# Patient Record
Sex: FEMALE | Race: WHITE
[De-identification: names, ages, dates, MRNs, and addresses within clinical notes are randomized per-mention and may not be internally consistent; named-entity substitution may affect disease eponyms.]

---

## 2019-06-27 NOTE — NUR
TELE RN CLOSING NOTES



PT REMAINS IN BED, AWAKE. A/OX2-3, Malay SPEAKING, CAN UNDERSTAND A LITTLE ENGLISH, WITH 
EPISODES OF CONFUSION. PT ON SUPPLEMENTAL OXYGEN AT 2L VIA NC, WITH NO RESPIRATORY DISTRESS 
NOTED. ON TELEMONITORING WITH SR HR OF 85. PT DENIES ANY PAIN OR DISCOMFORT. IVF NS AT 
75ML/HR TO RIGHT HAND G20, INTACT AND FLUID INFUSING WELL WITH NO INFILTRATION NOTED. PT HAS 
DOAN CATH WITH JAMES-YELLOWISH URINE IN THE BAG PRESENT, URINE OUTPUT OF 350ML.  ALL NEEDS 
AND CARE PROVIDED. REPOSITIONED [id number]. PT'S BED KEPT IN LOWEST, LOCKED POSITION WITH SR 
X3. CALL LIGHT AND FLUID KEPT WITHIN REACH. WILL ENDORSE TO NIGHT SHIFT NURSE FOR USMAN.

## 2019-06-27 NOTE — NUR
RN PM OPENING NOTES. 



BEDSIDE REPORT RECIEVED FROM CECI RODRIGUEZ. PT REMAINS IN BED, AWAKE. A/OX2-3, PATIENT SPEAKS 
Spanish PER GRAND DAUGHTER WHOM IS AT THE BEDSIDE. CAN UNDERSTAND A LITTLE ENGLISH BUT WILL 
RESPOND IN Spanish PER GRAND DAUGHTER. . PT ON SUPPLEMENTAL OXYGEN AT 2L VIA NC, WITH NO 
RESPIRATORY DISTRESS NOTED. TELEMETRY SR IN 80'S. PT DENIES ANY PAIN OR DISCOMFORT. IVF NS 
AT 75ML/HR TO RIGHT HAND G20, INTACT AND FLUID INFUSING NO INFILTRATION NOTED.  DOAN CATH 
WITH JAMES-YELLOWISH URINE IN THE BAG PRESENT.  SKIN PRECAUTIONS BED  IN LOW, LOCKED 
POSITION WITH SR X3. CALL LIGHT KEPT WITHIN REACH. WILL CONT TO MONITOR.

## 2019-06-27 NOTE — NUR
TELE RN ADMISSION NOTES





RECEIVED PT FROM ER DEPT. REPORT GIVEN BY RN/MACARIO. PT BROUGHT IN TO THE UNIT VIA GURNEY TO 
ROOM 307-2. PT INTERMITTENTLY DOZING OFF, EASILY AROUSED. A/OX2-3, Tongan SPEAKING, CAN 
UNDERSTAND A LITTLE ENGLISH. PT ON SUPPLEMENTAL OXYGEN AT 2L VIA NC, WITH NO RESPIRATORY 
DISTRESS NOTED, SATURATING 100%. PT HOOKED ON TELEMONITORING WITH SR HR OF 83. PT DENIES ANY 
PAIN OR DISCOMFORT. PIV RIGHT HAND G20, FLUSHED WITH NS, INTACT AND OPERATIONAL; HOOKED IVF 
NS AT 75ML/HR, INTACT AND FLUID INFUSING WELL WITH NO INFILTRATION NOTED. RIGHT HAND IS 
RIGID AND CONTRACTED. SKIN ASSESSED, INTACT. SACRUM WITH NON BLANCHABLE REDNESS, MEPILEX 
APPLIED AND PICTURE TAKEN AND FILED IN THE CHART. NO OTHER SKIN ISSUES NOTED AT THE TIME OF 
ADMISSION. ADMISSION INFORMATION PROVIDED FROM SNF RECORDS. PT UNABLE TO ANSER MOST OF 
QUESTIONS. NO FAMILY MEMBER PRESENT DURING ADMISSION. PT HAS DOAN CATH WITH JAMES-YELLOWISH 
URINE IN THE BAG PRESENT. PER ER NURSE/MACARIO, PT HAD TOTAL OF 2 BAGS OF 10MEG POTASSIUM IV 
AND 2L NS BOLUS AT ER. RN/MACARIO ENDORSED 2BAGS OF POTASSIUM TO BE INFUSED IN THE UNIT. NO 
EDEMA PRESENT AT THIS MOMENT NOTED. ALL NEEDS AND CARE PROVIDED. VITAL SIGNS, RECORDED. 
HOSPITALIST DR ANUJ MOORE. PRESENT IN THE UNIT, AWARE OF PT'S ADMISSION. WILL CONTINUE PLAN OF 
CARE.

## 2019-06-27 NOTE — NUR
RN NOTES



PT REFUSED TO HAVE INSULIN 4UNITS SHOT PER SLIDING SCALE. PT A LITTLE COMBATIVE. . 
WILL CONTINUE TO MONITOR.

## 2019-06-27 NOTE — NUR
[id number] FOR WEAKNESS AND MORE ALTERED THAN USUAL X YESTERDAY, PT IS 
AAOX0, NOT IN RESPIRATORY DISTRESS, HOOKED TO MONITOR, KEPT RESTED AND 
COMFORTABLE, WILL CONTINUE TO MONITOR.

## 2019-06-28 NOTE — NUR
WOUND CARE CONSULT   WOUND CARE RECEIVED CONSULT FOR REDNESS TO SACRUM, WOUND TO RIGHT 
PERINEUM AND LOWER BUTTOCKS.  WOUND CARE WILL DEFER CONSULT AND TREATMENT PLANS TO PLASTIC 
SURGICAL TEAM WHO ARE CURRENTLY FOLLOWING THIS PATIENT.  PATIENT WITH ALLI AT 12, ALL 
PRESSURE ULCER PREVENTION MEASURES ARE NOTED TO BE IN PLACE AT THIS TIME.  WILL SEE PRN.

## 2019-06-28 NOTE — NUR
TELE RN CLOSING NOTES



PT REMAINS IN BED, SLEEPING INTERMITANTLY. PATIENT HAD A FEW EPISODES OF CONFUSION DURING 
SHIFT BUT REORIENTED TO SURROUNDINGS. PT ON SUPPLEMENTAL OXYGEN AT 2L VIA NC, WITH NO 
RESPIRATORY DISTRESS NOTED. ON TELEMONITORING WITH SR. FLACC SCALE OF 2 SO NO APPARENT 
DISCOMFORT.  IVF INFUSING AT NS AT 75ML/HR TO RIGHT HAND G20,  NO INFILTRATION NOTED. PT HAS 
DOAN CATH WITH JAMES-YELLOWISH URINE IN THE BAG PRESENT.  REPOSITIONED [id number]. SAFETY 
MEASURES IN PLACE, PT'S BED KEPT IN LOWEST, LOCKED POSITION WITH SR X3.

## 2019-06-28 NOTE — NUR
TELE RN OPENING NOTES



RECEIVED PT LAYING IN BED, RESTING COMFORTABLY. PT IS EASILY AROUSABLE. RESPIRATIONS ARE 
EVEN AND UNLABORED, NOT IN ANY ACUTE DISTRESS NOTED. NO FACIAL GRIMACING OR MOANING NOTED. 
IV ACCESS TO LEFT HAND INTACT, NO INFILTRATION NOTED. DRESSING KEPT CLEAN AND DRY. SAFETY 
MEASURES ARE IN PLACE. INSTRUCTED PT TO USE CALL LIGHT WHEN ASSISTANCE IS NEEDED, CALL LIGHT 
IS LEFT WITHIN REACH. WILL MONITOR THROUGHOUT SHIFT FOR CONTINUITY OF CARE.

## 2019-06-28 NOTE — NUR
MS RN CLOSING NOTES



ALL DUE MEDS GIVEN. NEEDS MET AND ANTICIPATED. PT IS A/O X2, AFEBRILE. RESPIRATIONS ARE EVEN 
AND UNLABORED, NOT IN ANY ACUTE DISTRESS NOTED. NO FACIAL GRIMACING OR MOANING NOTED. IV 
SITE TO RIGHT HAND INTACT, NO INFILTRATION NOTED. DRESSING KEPT CLEAN AND DRY. SAFETY 
MEASURES ARE IN PLACE. REPOSITIONED Q2H. WILL ENDORSE TO NEXT SHIFT FOR CONTINUITY OF CARE.

## 2019-06-28 NOTE — NUR
MS RN OPENING NOTES



Received patient resting in bed, alert, oriented x 2. Peripheral IV infusing at 75mL/hr. 
Mcclain catheter in place, draining yellow urine. Breathing even and unlabored. Not in any 
distress, with supplemental O2 at 2LPM via nasal cannula. Safety measures in place; call 
light within reach, bed in low, locked position. Will continue to monitor accordingly

## 2019-06-28 NOTE — NUR
MS RN NOTES-- NEEDS MET AND ANTICIPATED. PT DOES NOT APPEAR TO BE IN ANY APPARENT DISTRESS. 
REPOSITIONED Q2H. WILL CONTINUE TO MONITOR.

## 2019-06-29 NOTE — NUR
MS RN NOTES-- NEEDS ANTICIPATED. PT DOES NOT APPEAR TO BE IN ANY DISTRESS. REPOSITIONED Q2H. 
WILL CONTINUE TO MONITOR.

## 2019-06-29 NOTE — NUR
MS RN CLOSING NOTES



ALL DUE MEDS GIVEN, NEEDS MET AND RENDERED. PT IS A/O X1-2 AFEBRILE. RESPIRATIONS ARE EVEN 
AND UNLABORED, NOT IN ANY ACUTE DISTRESS NOTED. PT DENIES ANY PAIN AT THIS TIME, NO C/O SOB, 
N/V. IV ACCESS TO RIGHT HAND INTACT, NO INFILTRATION NOTED. DRESSING KEPT CLEAN AND DRY. 
SAFETY MEASURES ARE IN PLACE. REMINDED PT TO USE CALL LIGHT WHEN ASSISTANCE IS NEEDED. WILL 
ENDORSE TO NEXT SHIFT FOR CONTINUITY OF CARE.

## 2019-06-29 NOTE — NUR
MS RN CLOSING NOTES



Patient sleeping in bed, easily arousable. Breathing even and unlabored. Not in any 
distress, on 2L via nasal cannula. Peripheral IV infusing at 75mL/hr. BSL recheked- 64mg/dL. 
No acute changes overnight. All needs attended and anticipated. Safety measures in place. 
Endorsed USMAN to AM shift RN

## 2019-06-29 NOTE — NUR
MS RN OPENING NOTES



RECEIVED PT LAYING IN BED, RESTING COMFORTABLY. PT IS EASILY AROUSABLE. RESPIRATIONS ARE 
EVEN AND UNLABORED, NOT IN ANY ACUTE DISTRESS NOTED. NO FACIAL GRIMACING OR MOANING NOTED. 
IV ACCESS TO RIGHT HAND INTACT, NO INFILTRATION NOTED. DRESSING KEPT CLEAN AND DRY. SAFETY 
MEASURES ARE IN PLACE. INSTRUCTED PT TO USE CALL LIGHT WHEN ASSISTANCE IS NEEDED, CALL LIGHT 
IS LEFT WITHIN REACH. WILL MONITOR THROUGHOUT SHIFT FOR CONTINUITY OF CARE.

## 2019-06-30 NOTE — NUR
MS RN CLOSING NOTES



Patient sleeping in bed, easily arousable. Breathing even and unlabored. Not in any 
distress, on 2L via nasal cannula. Peripheral IV infusing at 75mL/hr. Mcclain catheter in 
place, drained 1,800 mL of urine. No acute changes overnight. All needs attended and 
anticipated. Safety measures in place. Will endorse USMAN to AM shift RN

## 2019-06-30 NOTE — NUR
RN OPENING NOTES



RECEIVED PATIENT IN BED AWAKE. Malaysian SPEAKING ONLY. A/O X 1. RESPIRATIONS EVEN AND 
UNLABORED. NO SIGNS OF RESPIRATORY DISTRESS. DENIES SOB. DENIES PAIN OR DISCOMFORT AT THIS 
TIME. IV SITE RIGHT HAND INFUSING NS @75 ML/HR. SAFETY PRECAUTIONS IMPLEMENTED; CALL LIGHT 
WITHIN REACH, BED LOWEST POSITION, BED LOCKED, SIDE RAILS UP X2. WILL CONTINUE TO MONITOR.

## 2019-06-30 NOTE — NUR
RN NOTES

 RECEIVED PATIENT IN THE BED SLEEPING.  NO ACUTE RESPIRATORY DISTRESS, V/S STABLE, INFUSING 
NS AT RIGHT HAND 75 ML/HR INTACT, CALL LIGHT WITHIN TO REACH, CONTINUED MONITORING.

## 2019-06-30 NOTE — NUR
RN NOTES

 BS-107 MG/DL NO COVERAGE GIVEN, ADMINISTERED SCHEDULED MEDICATION. PATIENT POOR EATER EAT 
25% WITH ASSIST. PATIENT CONFUSED, DELUSIONAL ASSIST TURN AND REPOSTION Q 2 HR.

## 2019-06-30 NOTE — NUR
RN NOTES

 BS-100 MG/DL NO COVRAGE GIVEN ADMINISTERED SCHEDULED MEDICATION, PATENT EAT 20%, STILL 
CONFUSED, DELUSIONAL, INFUSING POTASSIUM 30ML/HR ON RIGHT HAND INTACT.

## 2019-06-30 NOTE — NUR
RN NOTES

 PATIENT'S SON NEXT TO THE BED, ASSIST EATING BREAKFAST.  HOB KEEP ELEVATED FOR ASPIRATION 
PRECAUTION. ADMINISTERED SCHEDULED MEDICATION BY CRUSHED AND MIXED WITH APPLE SOUSE, PATIENT 
Kenyan SPEAKER. PATIENT BED BOUND,  RIGHT SIGHT WEAKNESS, EDEMA ON RIGHT HAND KEEP ELEVATED 
USING PILLOW,RIGHT SIDE OF FACE ALSO DROOPING.  F/C DRAINING LIGHT YELLOW OUTPUT. ASSIST 
PATIENT TURN AND REPOSTION Q 2 HR. INFUSING NS AT 75 ML/HE N RIGHT HAND. CONTINUED 
MONITORING.

## 2019-06-30 NOTE — NUR
RN NOTES

 PATIENT CONFUSED, IRRITABLE, FINISHED POTASSIUM INFUSION. CONTINUED NA  75 ML/HR INFUSION 
INTACT. ASSIST TURN AND REPOSTION Q 2 HR. F/C DRAINING YELLOW OUTPUT TOTAL IS 1500 ML. CALL 
LIGHT WITHIN TO REACH. PATIENT REFUSED PAIN. ENDORSED ONCOMING NURSE FOLLOW PLAN OF CARE.

## 2019-07-01 NOTE — NUR
RN CLOSING NOTES



PATIENT IS SLEEPING IN BED, EASILY AROUSABLE. NO SIGNS OF RESPIRATORY DISTRESS. NO SIGNS OF 
SHORTNESS OF BREATH. PATIENT ON ROOM AIR, TOLERATING WELL. DOAN CATHETER IN PLACE. NO ACUTE 
CHANGES OVERNIGHT. WOUND CARE DONE ON BUTTOCKS, SACRAL AND PERINEUM. PATIENT TURNED EVERY 2 
HOURS. ALL NEEDS ATTENDED AND ANTICIPATED. SAFETY PRECAUTIONS IMPLEMENTED; CALL LIGHT WITHIN 
REACH, BED IN LOWEST POSITION, BED LOCKED, SIDE RAILS UP X2. ENDORSED CARE TO MICHAEL ROBISON 
FOR CONTINUITY OF CARE.

## 2019-07-01 NOTE — NUR
MS RN NOTES:



BLOOD SUGAR THIS PM . NO INSULIN WAS ADMINISTERED. INSTEAD, FED PT MORE APPLE SAUCE 
ALTHOUGH PT IS REFUSING MORE THAN 2 SPOONFULS. WILL CONTINUE TO OFFER AND MONITOR.

## 2019-07-01 NOTE — NUR
RN NOTES 

PATIENT SON NEXT TO THE BED ASSISTING PATIENT TO EAT, V/S STABLE, ADMINISTERED SCHEDULED 
MEDICATION. PATIENT REFUSED PAIN. ASSIST TURN AND REPOSTION Q2 HR.  INFUSING NS AT 75 ML/HR 
INTACT. CALL LIGHT WITHIN TO REACH. CONTINUED MONITORING.

## 2019-07-01 NOTE — NUR
RN NOTES

 PATIENT STABLE , ON O2-2L NC, ADMINISTERED SCHEDULED MEDICATION,  ASSIST TURN AND REPOSTION 
Q 2 HR,   F/C DRAINING LIGHT YELLOW OUTPUT, INFUSING NS AT 75 ML/HR ON RIGHT HAND INTACT, 
MEDICATION WERE ADMINISTERED FOR PAIN EFFECTIVE. ENDORSED ONCOMING NURSE FOLLOW PLAN OF 
CARE.

## 2019-07-01 NOTE — NUR
RN NOTES

 ADMINISTERED NARCO 5/325 MG PO PRN FOR GENERALIZED PAIN 6/10 PER PATIENT REQUEST. V/S TAKEN 
/74, P-92. CONTINUED MONITORING.

## 2019-07-01 NOTE — NUR
RN NOTES

 BS-126 MG/DL, PATIENT EAT 15%, ADMINISTERED SCHEDULED MEDICATION, ASSIST TURN AND REPOSTION 
Q 2 HR. CONTINUED MONITORING.

## 2019-07-01 NOTE — NUR
RN NOTES

 PATIENT SLEEPING AT THIS TIME, NO ACUTE RESPIRATORY DISTRESS,ON O2-2L NC. AROUSE WHEN 
CALLED NAME OR TOUCHED.  F/C DRAINING YELLOW OUTPUT. CALL LIGHT WITHIN TO REACH. CONTINUED 
MONITORING.

## 2019-07-01 NOTE — NUR
RN NOTES

 BS-117 MG/DL, NO COVERAGE GIVEN, PATIENT EAT 75 %, SISTER NEXT TO THE BED. ASSIST TURN AND 
REPOSTION Q 2 HR. SAFETY PRECAUTION MAINTAINED ALL THE TIME.

## 2019-07-01 NOTE — NUR
MS RODRIGUEZ OPENING NOTES:



RECEIVED PT ON ROOM AIR AND IS TOLERATING WELL. PT APPEARS TO BE CONFUSED AND Uzbek 
SPEAKING ONLY. PT HAS IV ON L AC #20G AND IS BEING INFUSED WITH IV  NS AT 75ML/HR. BED ALARM 
ACTIVATED. PT HAS DOAN CATH AND IS ATTACHED TO DRAINAGE BAG WITH YELLOW URINE DRAINING. BED 
KEPT IN LOW, LOCKED POSITION, AND SIDE RAILS X 2UP. WILL CONTINUE TO MONITOR PT. 

-------------------------------------------------------------------------------

Addendum: 07/01/19 at 1959 by HARISH GAMEZ RN

-------------------------------------------------------------------------------

NOTED L SIDED DROOPING.

## 2019-07-02 NOTE — NUR
MS RN NOTES:



BLOOD SUGAR THIS AM WAS 80. NO INSULIN WAS ADMINISTERED. PT GIVEN APPLE JUICE INSTEAD. WILL 
CONTINUE TO MONITOR.

## 2019-07-02 NOTE — NUR
MS RN CLOSING NOTES:



ALL NEEDS WERE ATTENDED AND ANTICIPATED FOR. PT KEPT CLEAN, DRY, AND COMFORTABLE. PT TURNED 
AND REPOSITIONED Q2HRS. PT Tanzanian SPEAKING ONLY. PT REORIENTED PRN. PT HAS IV AND IS BEING 
INFUSED WITH IV NS AT 75ML/HR. DOAN CATH REMAINS IN PLACE AND IS ATTACHED TO DRAINAGE BAG 
WITH YELLOW URINE DRAINING. OUTPUT WAS 900ML. BED KEPT IN LOW, LOCKED POSITION, AND SIDE 
RAILS X 3 UP. BED ALARM ACTIVATED. AS WELL. BLOOD SUGAR THIS MORNING WAS 80. NO INSULIN WAS 
ADMINISTERED. WILL ENDORSE TO AM NURSE FOR USMAN.

## 2019-07-02 NOTE — NUR
MS RN CLOSING/DISCHARGE NOTES



PATIENT IN BED, ALERT AND AWAKE. RESPONSIVE TO VERBAL AND TACTILE STIMULI. [place] SPEAKING 
BUT ABLE TO POINT TO NEEDS. HOB ELEVATED. NO SOB. ON ROOM AIR WITH 96% SPO2. DOAN CATHETER 
INTACT DRAINING YELLOW COLORED URINE VIA GRAVITY VIA BEDSIDE. NO SOB. DENIES ANY C/O PAIN 
NOR DISCOMFORT AT THIS TIME.  IV ACCES REMOVED WITH CATHETER TIP IN PLACE AND MINIMAL 
BLEEDING OBSERVED, PRESSURE APPLIED.  MD AWARE OF LABS. PATIENT WITH NO BELONGINGS DURING 
HOSPITAL STAY. D/C INSTRUCTIONS AND PACKET GIVEN TO EMT. CALLED [facility] 
[facility] AND GAVE REPORT TO MICHAEL ADLER. PATIENT LEFT IN STABLE CONDITION VIA GURNEY ACCOMPANIED 
[facility]

## 2019-07-02 NOTE — NUR
MS RN OPENING NOTES



RECEIVED PATIENT IN BED ASLEEP BUT AROUSABLE. HOB ELEVATED. Ugandan SPEAKING. LEFT AC 

G#20 INTACT AND PATENT INFUSING NS @ 75ML/HR GARRY WELL. DOAN CATHETER INTACT DRAINING YELLOW 
COLORED URINE VIA GRAVITY VIA BEDSIDE. NO SOB. DENIES ANY C/O PAIN NOR DISCOMFORT AT THIS 
TIME. BED IN LOWEST POSITION. CALL LIGHT WITHIN REACH.

## 2019-07-29 NOTE — NUR
RN NOTE:



FAMILY REQUESTED FOR A PUREED DIET FOR THE PATIENT. THOMAS COX NP MADE AWARE AND AGREED. 
ORDER NOTED AND CARRIED OUT. REQUESTED TO KITCH[employer] FOR THE NEW MEAL TRAY.

## 2019-07-29 NOTE — NUR
RN NOTE:



RECEIVED PATIENT IN BED, ASLEEP AND AROUSABLE WITH TACTILE STIMULATION. BREATHING EVENLY AND 
UNLABORED SATURATING 96% IN ROOM AIR. NO FACIAL GRIMACING NOTED. (R) FOOT IV SITE NOTED 
PATENT AND INTACT INFUSING NS @75ML/HR. PER PM SHIFT NURSE, THE PATIENT WAS A HARDSTICK. 
PATIENT ON CONTACT ISOLATION FOR POSSIBLE C. DIFF. WILL COLLECT STOOL WITHIN THE DAY IF IT 
MEETS THE CRITERIA FOR C. DIFF COLLECTION. INFECTION CONTROL NURSE JACKIE WAS AWARE. 
PATIENT WAS NOTED WITH DOAN CATHETER IN PLACED DRAINING DARK YELLOW URINE IN GRAVITY. BED 
ALARMED AND LOCKED AT ALL TIMES. CALL LIGHT WITHIN REACH. NEEDS ANTICIPATED.

## 2019-07-29 NOTE — NUR
RN NOTE:



BEDSIDE REPORT WAS GIVEN TO PM SHIFT NURSE FOR CONTINUITY OF CARE. PATIENT % OF HIS 
DINNER MEAL AND A FEEDER. NO LOOSE STOOL WAS NOTED WITHIN THE SHIFT. ENDORSED TO PM SHIFT TO 
CLOSELY MONITOR IF PATIENT WILL BE HAVING ANY EPISODE OF LOOSE STOOL. PATIENT REMAINED ON 
CONTACT ISOLATION FOR POSSIBLE C. DIFF. (L) UA MIDLINE WAS NOTED PATENT AND INTACT AND WAS 
INFUSING NS @75ML/HR. BED ALARMED AND LOCKED AT ALL TIMES.

## 2019-07-29 NOTE — NUR
RN NOTE:



INFORMED THOMAS COX NP REGARDING THE PATIENT'S (R) FOOT IV SITE. NP WAS INFORMED THAT 
THE PATIENT WAS NOTED TO BE A HARSTICK AND THE IV TEAM TRIED MULTIPLE TIMES INSERTING AN IV. 
PER BUSHRA COX, OK TO INSERT A MIDLINE, ORDER, NOTED AND CARRIED OUT.

## 2019-07-29 NOTE — NUR
RN NOTE

RECEIVED PATIENT FROM ER, DISORIENTED, AWAKE, ON 2L/MIN VIA NASAL CANULA, DOAN CATH NOTED, 
INTACT, URINE IS DARK/JAMES, CLOUDY, DX GIB, POSSIBLE C-DIFF, SKIN PICTURES TAKEN AND PLACED 
IN THE CHART, RIGHT FOOT 20 GAUGE ONGOING IV NS 0.9% FROM ER, RECEIVED AN ORDER FROM LATOYA BOWER DNR/DNI STATUS, ALSO NOTIFIED LATOYA BOWER THAT PATIENT MOANING WHEN MOVING ARM 
AND RIGHT SIDE RIB AREA, ORDER OF X-RAY GIVEN AND PLACED, ALL SAFETY MEASURES TAKEN, WILL 
CONTINUE TO MONITOR

## 2019-07-29 NOTE — NUR
RN NOTE:



INFORMED THOMAS COX NP REGARDING THE ABDOMINAL ULTRASOUND RESULT. AWAITING FOR NP'S 
RESPONSE.

## 2019-07-30 NOTE — NUR
RN NOTE:



MAGNESIUM CITRATE AND GOLYTELY WAS ENDORSED TO PM SHIFT THAT THE PATIENT NEEDED TO START 
WITH FOR THE BOWEL PREPARATION OF THE COLONOSCOPY/EGD BY TOMORROW. CALLED PHARMACY AND 
INFORMED THEM ABOUT THE GOLYTELY TO BE DELIVER TO THE UNIT. MAGNESIUM CITRATE WAS ENDORSED 
TO PM SHIFT NURSE TO ADMINISTER TO THE PATIENT.

## 2019-07-30 NOTE — NUR
MS RN OPENING NOTES



RECEIVED PATIENT ASLEEP BUT AROUSABLE, . ON O2 2L/MIN VIA NASAL CANULA, TOLERATING 
WELL,SATING AT 98% DOAN CATHETER INTACT AND PATENT, URINE IS DARK YELLOW, CLOUDY, DX GIB, 
POSSIBLE C-DIFF.ON CONTACT ISOLATION.H/O C-DIFF.IV IS INTACT AND PATENT WITH IVF.NO SOB NO 
DISTRESS NOTED.BED IS LOW AND IN LOCKED POSITION.SRX3.CALL LIGHT WITH IN EASY REACH.BED 
ALARM ON .WILL CONTINUE TO MONITOR ACCORDINGLY.

## 2019-07-30 NOTE — NUR
RN NOTE:



BEDSIDE REPORT WAS GIVEN TO PM SHIFT NURSE FOR CONTINUITY OF CARE. PATIENT REMAINED ON 
CONTACT ISOLATION FOR POSSIBLE C. DIFF. PATIENT ATE 50% OF HER DINNER MEAL.

## 2019-07-30 NOTE — NUR
RN NOTE:



RECEIVED A TELEPHONE VERBAL CONSENT FROM MARTIN LANTIGUA, DAUGHTER REGARDING THE 
EGD/COLONOSCOPY PROCEDURE BY ILEANA CHAVEZ NP (GI). CONSENT WAS SIGNED AND FILED ON THE 
PATIENT'S CHART. DAUGHTER MARTIN WAS REQUESTING IF THE MD CAN ORDER EKG PRIOR TO THE 
PROCEDURE. ENDORSED TO PM SHIFT NURSE FOR CONTINUITY OF CARE AND TO RELAY THE MESSAGE TOT HE 
DOCTOR.

## 2019-07-30 NOTE — NUR
RN NOTE:



RECEIVED BEDSIDE REPORT FROM ALEX CHATTERJEE RN FOR CONTINUITY OF CARE. PATIENT WAS NOTED ASLEEP 
IN BED, WITH HOB ELEVATED. RESPIRATION EVEN AND UNLABORED SATURATING 95% WITH O2 2L/MIN VIA 
NC. (L) UA MIDLINE NOTED PATENT AND INTACT INFUSING NS @75ML/HR. REMAINED ON CONTACT 
ISOLATION FOR C. DIFF. C. DIFF STOOL STILL PENDING. BED ALARMED AND LOCKED AT ALL TIMES. 
CALL LIGHT WITHIN REACH. NEEDS ANTICIPATED.

## 2019-07-30 NOTE — NUR
RN NOTES



SEEN AND EXAMINED BY BUSHRA CHAVEZ, RECTAL EXAM DONE, WITH NEW ORDERS TO DO DIGITAL 
DISIMPACTION UNDER GENERAL ANESTHESIA. WILL OBTAIN CONSENT. 



PATIENT RESTING COMFORTABLY AT THIS TIME. WILL CONTINUE TO MONITOR.

## 2019-07-30 NOTE — NUR
MS RN OPENING NOTE

RECEIVED REPORT FROM PM NURSE.PATIENT SLEEPING.RESPONDS TO STERNAL RUB.VITAL SIGNS STABLE. 
ON O2 2L/MIN VIA NASAL CANULA,HAS  DOAN CATH , URINE IS DARK YELLOW, CLOUDY, DX GIB, 
POSSIBLE C-DIFF.ON CONTACT ISOLATION.H/O C-DIFF.IV IS INTACT AND PATENT WITH IVF.NO SOB NO 
DISTRESS NOTED.BED IS LOW AND IN LOCKED POSITION.SRX3.CALL LIGHT IN REACH.BED ALARM ON .WILL 
CONTINUE TO MONITOR.

## 2019-07-31 NOTE — NUR
m,edications not given as the patient is npo for the scheduled procedure, Family called at 
[phone] but message left ,no response. called  , FIGUEROA CONTI ANSWERED 
AND INFOERMED THAT THE PATIENT IS GOING TO HAVE PROCEDU REAND THAT NO ONE RESPONDING AN 
OKAYED TO DO A TELEPHONE CONSENT , [facility] DONE WITH ANOTHER RN, CHARGE NURSE.

## 2019-07-31 NOTE — NUR
MS RN NOTES

PT USMAN REPORT GIVEN BY MICHAEL FLANNERY.RECEIVED PT LYING ON BED S/P DIGITAL DISIMPACTION DONE.PT 
TOLERATED WELL.ON NC 2LPM CONTINUOUSLY.ON FC IS IN PLACE AND MIDLINE PRESENT PN LEFT UA WITH 
IV NS @75CC/HR IS RUNNING.IV SITE IS CLEAN,DRY AND INTACT.NO INFILTRATION NOTED.BED IS IN 
LOW POSITION AND LOCKED,CALL LIGHT IS WITHIN REACH.WILL CONTINUE TO MONITOR THE PT CLOSELY.

## 2019-07-31 NOTE — NUR
PATIENT RECIEVED ON BED ASLEEP NPO WITH IVF ON FOR THE SCHEDULED PROCEDURE, CONSENT TO BE 
SIGNED, NIGHT SHIFT RN CALLED THE FAMILY AND LEFT A MESSAGE , NO RESPONSE, WILL FOLLOW UP 
WITH THE CALL

## 2019-07-31 NOTE — NUR
patient in from ORper bed, sleepy but arousable. ivf resumed, all orders to be resumed 
postoperatively. vital signs checked and rescorded

## 2019-07-31 NOTE — NUR
RN NOTES



CALLED AND LEFT MESSAGE TO MARTIN ( DAUGHTER ) TO OBTAIN PROCEDURE CONSENT, AWAITING FOR A 
RETURN CALL, WILL ENDORSE TO AM NURSE TO FOLLOW UP, PROCEDURE FOR DIGITAL DISIMPACTION UNDER 
GENERAL ANESTHESIA WILL BE DONE TODAY AS PER ORDER.

## 2019-07-31 NOTE — NUR
MS RN NOTES



RECEIVED PT IN BED AWAKE AND ABLE TO MAKE NEEDS KNOWN.  PT A/O X1-2 AND Solomon Islander SPEAKING 
WITH PERIODS OF CONFUSION.  RESPIRATIONS EVEN AND UNLABORED WITH NO S/S OF ACUTE DISTRESS OR 
SOB NOTED.  NO COMPLAINTS OF PAIN AT THIS TIME.  PT WITH CANDIE MIDLINE PATENT AND INTACT 
RUNNING NS@75ML/HR.  SAFETY MEASURES IN PLACE WITH BED IN LOWEST LOCKED POSITION WITH SIDE 
RAILS UP X2.  CALL LIGHT WITHIN REACH.  WILL CONTINUE TO MONITOR.

## 2019-07-31 NOTE — NUR
Dr Vasquez in and sen patient  and informed that the patient refused the golytely and had no 
bm noted, with orders to follow up with the stool for cdiff result sent on the 29th,but no 
result yet, will inga to call and inform Dr Vasquez for the result

## 2019-07-31 NOTE — NUR
MS RN CLOSING NOTES

PT IS LYING ON BED.ALERT/ORIENTED X1 WITH CONFUSED.ALL DUE MEDS ARE GIVEN.NO SOB AND ACUTE 
DISTRESS NOTED.WILL ENDORSE TO NIGHT SHIFT RN FOR USMAN.

## 2019-07-31 NOTE — NUR
MS RN NOTES

NP YULIYA TEJEDA INFORMED THAT  WILL TAKE CARE THE PT AND NO SCHEDULE FOR 
EGD/COLONOSCOPY IN AM.NEW ORDERS NOTED AND CARRIED OUT.

## 2019-07-31 NOTE — NUR
OR personell in for the patient to be taken to OR for the ordered procedure.onsent signed , 
npo and maiintained

## 2019-08-01 NOTE — NUR
MS/RN CLOSING NOTES



PATIENT CONTINUES TO REMAIN IN STABLE CONDITION. PROVIDED COMFORT AND SAFETY THROUGHOUT THE 
SHIFT. PATIENT IS ALERT AND ORIENTED X1-2 AND [place]araguan SPEAKING WITH PERIODS OF CONFUSION. NO 
PAIN OR ACUTE DISTRESS AT THIS TIME. RESPIRATIONS EVEN AND UNLABORED. SKIN IS DRY WARM TO 
TOUCH. PATIENT NOTED WITH CANDIE MIDLINE PATENT AND INTACT RUNNING NS@75ML/HR. ALL NEEDS 
ANTICIPATED. CALL LIGHT WITHIN REACHED. SAFETY MAINTAINED. BED LOCKED AND IN LOWEST 
POSITION. WILL CONTINUE TO MONITOR. ENDORSED TO PM NURSE FOR USMAN.

## 2019-08-01 NOTE — NUR
MS RN NOTES



PT IN BED AWAKE AND ABLE TO MAKE NEEDS KNOWN.  PT A/O X1-2 AND Cuban SPEAKING WITH PERIODS 
OF CONFUSION.  RESPIRATIONS EVEN AND UNLABORED WITH NO S/S OF ACUTE DISTRESS OR SOB NOTED.  
NO COMPLAINTS OF PAIN AT THIS TIME.  PT WITH CANDIE MIDLINE PATENT AND INTACT RUNNING 
NS@75ML/HR.  SAFETY MEASURES IN PLACE WITH BED IN LOWEST LOCKED POSITION WITH SIDE RAILS UP 
X2.  PT KEPT, CLEAN, DRY, AND COMFORTABLE.  PT TURNED Q2 HRS.  CALL LIGHT WITHIN REACH.  
WILL ENDORSE TO ONCOMING NURSE FOR USMAN..

## 2019-08-01 NOTE — NUR
WOUND CARE CONSULT  WOUND CARE RECEIVED CONSULT FOR SACRAL WOUND.  WOUND CARE WILL DEFER 
CONSULT AND TREATMENT PLANS TO PLASTIC SURGICAL TEAM WHO ARE CURRENTLY FOLLOWING THIS 
PATIENT.  PATIENT WITH ALLI AT 11, ALL PRESSURE ULCER PREVENTION MEASURES ARE NOTED TO BE 
IN PLACE.  WILL SEE PRN.

## 2019-08-01 NOTE — NUR
MS/RN NOTES



PATIENT WAS SEEN AND EVALUATED BY DR. SUNG, WITH A RECOMMENDATION TO PLACE AND NG TUBE 
AND GIVE GOLYTELY THRU IT. BUT WHEN THE PATIENT WAS ASKED THE PATIENT REFUSED TO HAVE AN NG 
TUBE.  TWILA WAS BROUGHT IN TO TALK TO PATIENT AND TO EXPLAIN RISK AND BENEFITS 
X3. STILL REFUSED X3. PATIENT CONTINUES TO REMAIN IN STABLE CONDITION. WILL CONTINUE TO 
MONITOR.

## 2019-08-01 NOTE — NUR
MS/RN OPENING NOTES



RECEIVED PATIENT IN BED AWAKE AND ABLE TO RESPOND TO VERBAL AND TACTILE STIMULI. PATIENT IS 
ALERT AND ORIENTED X1-2 AND French SPEAKING WITH PERIODS OF CONFUSION. NO PAIN OR ACUTE 
DISTRESS AT THIS TIME. RESPIRATIONS EVEN AND UNLABORED. SKIN IS DRY WARM TO TOUCH. PATIENT 
NOTED WITH CANDIE MIDLINE PATENT AND INTACT RUNNING NS@75ML/HR. ALL NEEDS ANTICIPATED. CALL 
LIGHT WITHIN REACHED. SAFETY MAINTAINED. BED LOCKED AND IN LOWEST POSITION. WILL CONTINUE TO 
MONITOR CLOSELY.

## 2019-08-01 NOTE — NUR
RN OPENING NOTES



RECEIVED PATIENT IN BED AWAKE. ON O2 2L/MIN VIA N/C, TOLERATING WELL. DOAN CATH INTACT AND 
PATENT. WILL MAINTAIN CONTACT PRECAUTIONS. NO SIGNS OF RESPIRATORY DISTRESS. NO SIGNS OF 
SHORTNESS OF BREATH. IV INTACT AND PATENT WITH IVF INFUSING. SAFETY PRECAUTIONS IMPLEMENTED; 
CALL LIGHT WITHIN REACH, BED ALARM ON, SIDE RAILS UP X2, BED LOW AND LOCKED POSITION. WILL 
CONTINUE TO MONITOR PATIENT.

## 2019-08-02 NOTE — NUR
RN CLOSING NOTES



PATIENT IN BED AWAKE. PATIENT A/O X 1-2. NO SIGNS OF RESPIRATORY DISTRESS. NO SIGNS OF 
SHORTNESS OF BREATH. NO COMPLAINTS OF PAIN AT THIS TIME. NO SIGNS OF DISCOMFORT NOTED. 
PATIENT WITH CANDIE MIDLINE PATENT AND INTACT RUNNING NS@ 75 ML/HR. SAFETY PRECAUTIONS 
IMPLEMENTED; CALL LIGHT WITHIN REACH, BED LOCKED AND LOW POSITION, SIDE RAILS UP X2. PATIENT 
KEPT CLEAN, DRY AND COMFORTABLE. WOUND CARE IMPLEMENTED TO SACRUM AND RIGHT INDEX FINGER. 
PATIENT TURNED EVERY 2 HOURS. WILL ENDORSE TO AM NURSE FOR CONTINUITY OF CARE.

## 2019-08-02 NOTE — NUR
MS/RN OPENING NOTES



RECEIVED PATIENT IN BED SLEEPING COMFORTABLY. EASILY AROUSABLE. NO PAIN OR ACUTE DISTRESS AT 
THIS TIME. RESPIRATION EVEN AND UNLABORED. SKIN IS DRY WARM TO TOUCH. PATIENT ON O2 2L/MIN 
VIA N/C, TOLERATING WELL. DOAN CATH INTACT AND PATENT. DRAINING  YELLOW URINE. ISOLATION 
MAINTAINED. NOTED WITH IV ACCESS ON LEFT UPPER ARM. INTACT AND PATENT WITH IVF INFUSING. 
FLUSHING WELL. ALL NEEDS ANTICIPATED. CALL LIGHT WITHIN REACHED. SAFETY MAINTAINED. BED 
LOCKED AND IN LOWEST POSITION. WILL CONTINUE TO MONITOR CLOSELY.

## 2019-08-02 NOTE — NUR
MS/RN CLOSING NOTES



PATIENT CONTINUES TO REMAIN IN STABLE CONDITION THROUGHOUT THE SHIFT. PROVIDED COMFORT AND 
SAFETY. NO ACUTE DISTRESS AT THIS TIME. RESPIRATION EVEN AND UNLABORED. PATIENT CONTINUES ON 
O2 2L/MIN VIA N/C, TOLERATING WELL. DOAN CATH INTACT AND PATENT. DRAINING  YELLOW URINE. 
ISOLATION MAINTAINED. NOTED WITH IV ACCESS ON LEFT UPPER ARM. INTACT AND PATENT WITH IVF 
INFUSING. FLUSHING WELL. CALL LIGHT WITHIN REACHED. BED LOCKED AND IN LOWEST POSITION. 
ENDORSED TO PM NURSE FOR USMAN.

## 2019-08-03 NOTE — NUR
MS1/RN

CLONIDINE 0.1 MG PO WAS GIVEN AS ORDERED FOR /72. ALL NEEDS ATTENDED AT THIS TIME, 
WILL CONTINUE TO MONITOR.

## 2019-08-03 NOTE — NUR
SON AT BEDSIDE. DISCUSSED SPEECH THERAPIST REC OF THIN VS THICKENED LIQUIDS, AS PATIENT IS 
NOTED TO COUGH AFTER DRINKING. SON STATES HE DOES NOT WANT THICKENED LIQUIDS AS HIS MOM 
"DOESN'T NEED IT", "DOCTOR AT  SAID IT WAS OK" AND IF SHE TAKES SMALL SIPS THEN "IT'S 
OK". EDUCATED ON RISKS, SON REFUSES THICKENED LIQUIDS. SUCTION SET UP AT BEDSIDE

-------------------------------------------------------------------------------

Addendum: 08/03/19 at 1120 by YNES ZULETA RN

-------------------------------------------------------------------------------

+ASPIRATION PRECAUTIONS MAINTAINED

## 2019-08-03 NOTE — NUR
RN NOTES

RECEIVE PT. AWAKE ON BED, A/OX2, CONFUSED, Cameroonian SPEAKING, F/C DRAINING CLEAR YELLOW 
URINE, WITH  RIGHT SIDED WEAKNESS , NO PAIN NOTED, NO SOB, CALL LIGHT WITHIN REACH, 
SIDERAILSUPX2, BED IN LOW POSITION, CONTINUE TO MONITOR

## 2019-08-03 NOTE — NUR
RN NOTES

PT. REFUSED HER MEDICATION, PER DAYSHIFT NURSE PT. WAS ALSO REFUSING HER MEDICATIONS THIS 
MORNING, EXPLAINED THE IMPORTANCE OF MEDICATIONS BUT PT. STILL REFUSINF DOESN'T WANT TO OPEN 
HER MOUTH

## 2019-08-04 NOTE — NUR
RN NOTES

PT. REFUSED HER ANTIBIOTIC  VANCO PO, NO PAIN NOTED, NO SOB, MORNING CARE RENDERED, CALL 
LIGHT WITHIN REACH, SIDERAILSUPX2, PT. NEEDS ATTENDED

## 2019-10-05 NOTE — NUR
MS RN ADMISSION NOTES





RECEIVED PT FROM ER VIA STRETCHER, ARRIVED AT THE UNIT AT 1600. PT A/O X2, Gabonese SPEAKING, 
CAN UNDERSTAND A LITTLE ENGLISH. PT TOLERATING RA, WITH NO ACUTE RESPIRATORY DISTRESS NOTED. 
PT DENIES ANY PAIN OR DISCOMFORT AT THE TIME OF ADMISSION. PT UNABLE TO SAY ANY HISTORY. RN 
SPOKE TO SAM/DAUGHTER VIA PHONE REGARDING DNR STATUS, MD MADE AWARE AND PLACED ORDER. 
SAM ALSO WISHES FOR PT NOT TO HAVE ANY VACCINES STATING "SHE DOESN'T NEED ANY VACCINES, 
DON'T GIVE." PT PIV TO LAC G20 SL AND R HAND G18, BOTH WAS FLUSHED WITH NS, INTACT AND 
OPERATIONAL. SKIN ASSESSED, WOUND PRESENT ON SACRAL AREA 1X1X0.5 CM AND NON BLANCHABLE 
REDNESS TO SACRUM AREA. FC NOTED WITH YELLOWIH ORANGE URINE IN THE BAG PRESENT. PT KEPT 
COMFORTABLE, CLEAN AND DRY. CALL LIGHT KEPT WITHIN REACH. PT'S BED IN LOWEST, LOCKED, 
POSITION X3. WILL ENDORSE TO INCOMING NIGHT NURSE FOR USMAN.

## 2019-10-05 NOTE — NUR
BIB RA [facility] FROM [facility], MORE ALTERED THAN NORMALBIB RA 39 FROM [facility] 
[facility]ACILITY, MORE ALTERED THAN NORMAL. PATIENT RESPONDS TO NAME, MOANING. ATTACHED 
TO THE CARDIAC MONITOR. IV LINE ESTABLISHED ON RIGHT HAND 18. RECTAL TEMP AT 
97.6. NO RESPIRATORY DISTRESS NOTED.

## 2019-10-05 NOTE — NUR
MS RN NOTES

RECEIVED ON BED A/O X1-2,Montserratian SPEAKING,CONFUSED.IVF NS AT 75ML/HR RATE IN PROGRESS VIA IV 
PUMP ON RIGHT HAND,SITE PATENT.DOAN CATH IN PLACE DRAINING YELLOWISH OUTPUT.FALL PRECAUTION 
OBSERVED,BED ON LOWEST POSITION AND LOCK,BED ALARM TRIGGERED.DNR/DNI STATUS WITH ORDER.WILL 
CONTINUE TO MONITOR STATUS.

## 2019-10-05 NOTE — NUR
PATIENT TRANSFERRED TO ROOM 309-1 VIA ACLS PROTOCOL. PATIENT A/OX2-3 IN STABLE 
CONDITION. ENDORSED TO MAY RN FOR USMAN.

## 2019-10-05 NOTE — NUR
MS RN NOTES





RECEIVED CALL FROM DAUGHTER/POA VIA PHONE STATING PT IS A DNR. ALSO, DAUGHTER REFUSED ANY 
VACCINES. MD/HOSPITALIST/RENATE MADE AWARE AND OKAY WITH IT. RN PUT AN ORDER FOR DNR. MD AWARE 
AND WITNESSED BY RN/AMANDA.

## 2019-10-06 NOTE — NUR
MS RN CLOSING NOTE



PATIENT IN BED RESTING COMFORTABLY. PATIENT IN NO ACUTE DISTRESS. NO SOB NOTED. PATIENT 
BREATHING IS EVEN AND UNLABORED. PATIENT SAFETY PRECAUTIONS IN PLACE. PATIENT HOB IS 
ELEVATED. PATIENT REPOSITIONED Q2H AND EXTREMITIES OFFLOADED ON PILLOWS. PATIENT KEPT CLEAN, 
DRY, AND COMFORTABLE THROUGHOUT SHIFT. PATIENT BED IS LOCKED AND IN LOWEST POSITION. CALL 
LIGHT WITHIN REACH. WILL ENDORSE CARE TO PM SHIFT FOR USMAN.

## 2019-10-06 NOTE — NUR
MS RN OPENING NOTES



RECEIVED PATIENT IN BED SLEEPING COMFORTABLY. PATIENT IN NO ACUTE DISTRESS. NO SOB NOTED. 
PATIENT BREATHING IS EVEN AND UNLABORED.  PATIENT DOAN CATHETER HANGING TO GRAVITY, 
DRAINING CLEAR YELLOW FLUID. PATIENT HOB SLIGHTLY ELEVATED. SAFETY PRECAUTIONS IN PLACE. 
PATIENT BED IS LOCKED AND IN LOWEST POSITION. CALL LIGHT WITHIN REACH. WILL CONTINUE TO 
MONITOR.

## 2019-10-06 NOTE — NUR
MS RN NOTES

FAIRLY RESTED,IVF INFUSING VIA RIGHT HAND SALINE LOCK VIA IV PUMP,REPOSITION PER 
PROTOCOL,DOAN CATH DRAINS WELL .IN NO ACUTE DISTRESS.DNR STATUS.WILL ENDORSE TO DAY NURSE 
FOR USMAN.

## 2019-10-06 NOTE — NUR
MS RN NOTES

NOTED HARD STOOL ON THE RECTUM,SIPPING LOOSE BROWN STOOL.SLIGHT DIS IMPACTION  DONE,LOTS OF 
HARD STOOL TAKEN OUT.

## 2019-10-06 NOTE — NUR
MS MICHAEL NOTES



RECEIVED CALL FROM LAB FOR CRITICAL VALUE . RECHECKED BLOOD SUGAR OF PATIENT AND IS 
217. PATIENT IS IN NO ACUTE DISTRESS. DR. PATEL MADE AWARE, NO ADDITIONAL INSULIN 
COVERAGE PER MD. SEEN AND EVALUATED BY MD. WILL CONTINUE TO MONITOR. 

-------------------------------------------------------------------------------

Addendum: 10/06/19 at 0803 by BHAVNA PUCKETT RN

-------------------------------------------------------------------------------

MD AWARE PATIENT HAD RECEIVED 3 UNITS PRIOR AT 0535 FOR BLOOD SUGAR .

## 2019-10-06 NOTE — NUR
MS RN NOTES



RECEIVED PATIENT AWAKE IN BED RESTING COMFORTABLY. PATIENT IN NO ACUTE DISTRESS. NO SOB 
NOTED. PATIENT BREATHING IS EVEN AND UNLABORED. PATIENT SAFETY MEASURES  IN PLACE. 
ASPIRATION PRECAUTION EMPHASIZE, PATIENT HOB IS ELEVATED. PATIENT REPOSITIONED Q2H AND 
EXTREMITIES OFFLOADED ON PILLOWS. PATIENT KEPT CLEAN, DRY, AND COMFORTABLE THROUGHOUT SHIFT. 
PATIENT BED IS LOCKED AND IN LOWEST POSITION. CALL LIGHT WITHIN EASY REACH. WILL MONITOR 
ACCORDINGLY.

## 2019-10-07 NOTE — NUR
MS/RN  NOTE



THE PATIENT IS RECEIVED IN BED AND AWAKE. PATIENT IS ALERT AND ORIENTED X1. IN ROOM AIR AND 
DENIES SOB. BREATHING REGULAR AND UNLABORED. DENIES PAIN. RIGHT HAND G 20 PATENT AND NS 
INFUSING AT 75ML/HR AND NO S/S INFILTRATION NOTED. BED LOW AND LOCKED. SIDE RAILS UP X3. 
CALL LIGHT WITHIN REACH. WILL CONTINUE TO MONITOR.

## 2019-10-07 NOTE — NUR
RN NOTES



ALL NEEDS ATTENDED AND MET, ABLE TO REST AND SLEPT, AT INTERVALS, ENDORSED TO AM NURSE FOR 
CONTINUITY OF CARE.

## 2019-10-07 NOTE — NUR
RN  NOTES



RECEIVED  PATIENT ALERT AND ORIENTED X1. Yemeni SPEAKING. REDIRECTION AND REORIENTATION 
PROVIDED AS NEEDED. PATIENT IN ROOM AIR AND DENIES SOB. RESPIRATION REGULAR AND UNLABORED. 
DENIES PAIN. THE PATIENT IN STABLE CONDITION. RIGHT HAND G 20 PATENT AND NS INFUSING AT 
75ML/HR AND NO S/S INFILTRATION NOTED. DOAN CATH PRESENT AND DRAINING CLEAR, YELLOW COLOR 
URINE. BED LOW AND LOCKED. SIDE RAILS UP X3. CALL LIGHT WITHIN REACH. WILL CONTINUE TO 
MONITOR ACCORDINGLY.

## 2019-10-07 NOTE — NUR
MS/RN  NOTE



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 DR PATEL IS MADE AWARE AND NEW ORDER 
IS OBTAINED. NOTED AND CARRIED OUT.

## 2019-10-07 NOTE — NUR
MS/RN  NOTE



THE PATIENT ALERT AND ORIENTED X1. Colombian SPEAKING. REDIRECTION AND REORIENTATION PROVIDED 
AS NEEDED. PATIENT IN ROOM AIR AND DENIES SOB. RESPIRATION REGULAR AND UNLABORED. DENIES 
PAIN. THE PATIENT IN STABLE CONDITION. RIGHT HAND G 20 PATENT AND NS INFUSING AT 75ML/HR AND 
NO S/S INFILTRATION NOTED. DOAN CATH PRESENT AND DRAINING CLEAR, YELLOW COLOR URINE. BED 
LOW AND LOCKED. SIDE RAILS UP X3. CALL LIGHT WITHIN REACH. WILL ENDORSE TO NIGHT SHIFT.

## 2019-10-08 NOTE — NUR
MS RN NOTES



PATIENT IN BED RESTING NO SOB OR ACUTE DISTRESS NOTED. PATIENT ALERT, ORIENTED X1-2. DENIES 
ANY PAIN OR DISCOMFORT. PERIPHERAL IV INTACT PATENT. BED IN LOW LOCKED POSITION. CALL LIGHT 
WITHIN REACH. WILL CONTINUE TO MONITOR.

## 2019-10-08 NOTE — NUR
MS RN NOTES 



PATIENT IN BED RESTING NO SOB OR ACUTE DISTRESS NOTED. REPORT GIVEN TO VIVIAN RODRIGUEZ AT [facility]. 
ALL DUE MEDICATIONS ADMINISTERED. ALL NEEDS MET. ALL BELONGINGS ACCOUNTED FOR, BELONGING 
LIST SIGNED.  PATIENT AWAITING FOR TRANSPORTATION FOR DISCHARGE. REPORT GIVEN TO RAYMUNDO 
FOR DISCHARGE.

## 2019-10-08 NOTE — NUR
MS RN NOTES 



SPOKE TO PHARMACY VERIFIED DOSE OF POTASSIUM PER DR. SHANNON KRAMER TO GIVE TOTAL OF 60 MEQ 
POTASSIUM. NOTED AND CARRIED OUT.

## 2019-10-08 NOTE — NUR
MS RN PM OPENING NOTE 



BEDSIDE REPORT RECIEVED FROM GRAHAM RODRIGUEZ,. PATIENT IN BED RESTING NO SOB OR ACUTE DISTRESS 
NOTED. PER REPORT PATIENT IS AWAITING AMBULANCE FOR DISCHARGE AND REPORT WAS GIVEN TO VIVIAN RODRIGUEZ AT [facility].

## 2019-10-08 NOTE — NUR
MS RN NOTES 



PATIENTS DAUGHTER VISITED PATIENT, CONFIRMED IF SHE AGREES WITH SERIAL DEBRIDEMENTS FOR 
PATIENT SHE STATES SHE REFUSES DEBRIDEMENT STATES SHE IS THE DPOA AND SHE MAKES DECISIONS 
AND SHE REFUSES DEBRIDEMENT.

## 2019-10-08 NOTE — NUR
RN NOTES



ALL NEEDS ATTENDED AND MET, ABLE TO OBTAIN CONSENT FOR SERIAL DEBRIDEMENT OF SACRUM, CALLED 
AND SPOKE WITH JEREMY CONTI #[phone]. ABLE TO REST AND SLEPT AT INTERVALS, KEPT 
CLEAN DRY AND COMFORTABLE. SAFETY MEASURES IN PLACE, ASPIRATION PRECAUTION 
EMPHASIZED.ENDORSED TO AM NURSE FOR CONTINUITY OF CARE.

## 2019-10-08 NOTE — NUR
KENDRA JARRETT ON UNIT FOR PATIENT . REPORT GIVEN TO AYANNA AMARO OF UNIT 38. PATIENT BEIGN 
DISCHARGED BACK TO [facility]. VSS. 

-------------------------------------------------------------------------------

Addendum: 10/08/19 at 2022 by RAYMUNDO BARNARD RN

-------------------------------------------------------------------------------

PATIENT BEING DISCHARGED WITH IV CATHETER TO LEFT AC 18 GAUGE. PATIENT ALSO BEING DISCHARGED 
WITH FC.

## 2019-10-08 NOTE — NUR
WOUND CARE CONSULT   WOUND CARE RECEIVED CONSULT FOR SACRAL WOUND/REDNESS.  WOUND CARE WILL 
DEFER CONSULT AND TREATMENT PLANS TO PLASTIC SURGICAL TEAM WHO ARE CURRENTLY FOLLOWING THIS 
PATIENT.  PATIENT WITH ALLI AT 13, ALL PRESSURE ULCER PREVENTION MEASURES ARE NOTED TO BE 
IN PLACE AT THIS TIME.  WILL SEE PRN.

## 2020-08-11 ENCOUNTER — HOSPITAL ENCOUNTER (INPATIENT)
Dept: HOSPITAL 54 - ER | Age: 80
LOS: 8 days | DRG: 177 | End: 2020-08-19
Attending: NURSE PRACTITIONER | Admitting: INTERNAL MEDICINE
Payer: MEDICARE

## 2020-08-11 VITALS — WEIGHT: 137 LBS | HEIGHT: 66 IN | BODY MASS INDEX: 22.02 KG/M2

## 2020-08-11 VITALS — SYSTOLIC BLOOD PRESSURE: 93 MMHG | DIASTOLIC BLOOD PRESSURE: 48 MMHG

## 2020-08-11 VITALS — DIASTOLIC BLOOD PRESSURE: 56 MMHG | SYSTOLIC BLOOD PRESSURE: 97 MMHG

## 2020-08-11 DIAGNOSIS — Z79.02: ICD-10-CM

## 2020-08-11 DIAGNOSIS — E86.0: ICD-10-CM

## 2020-08-11 DIAGNOSIS — E86.1: ICD-10-CM

## 2020-08-11 DIAGNOSIS — E88.09: ICD-10-CM

## 2020-08-11 DIAGNOSIS — E87.0: ICD-10-CM

## 2020-08-11 DIAGNOSIS — E43: ICD-10-CM

## 2020-08-11 DIAGNOSIS — Z66: ICD-10-CM

## 2020-08-11 DIAGNOSIS — I69.351: ICD-10-CM

## 2020-08-11 DIAGNOSIS — D72.829: ICD-10-CM

## 2020-08-11 DIAGNOSIS — E87.2: ICD-10-CM

## 2020-08-11 DIAGNOSIS — F03.90: ICD-10-CM

## 2020-08-11 DIAGNOSIS — Z53.20: ICD-10-CM

## 2020-08-11 DIAGNOSIS — K51.90: ICD-10-CM

## 2020-08-11 DIAGNOSIS — F09: ICD-10-CM

## 2020-08-11 DIAGNOSIS — F41.9: ICD-10-CM

## 2020-08-11 DIAGNOSIS — Z79.4: ICD-10-CM

## 2020-08-11 DIAGNOSIS — E11.65: ICD-10-CM

## 2020-08-11 DIAGNOSIS — M62.50: ICD-10-CM

## 2020-08-11 DIAGNOSIS — G93.41: ICD-10-CM

## 2020-08-11 DIAGNOSIS — Z87.440: ICD-10-CM

## 2020-08-11 DIAGNOSIS — I10: ICD-10-CM

## 2020-08-11 DIAGNOSIS — N17.0: ICD-10-CM

## 2020-08-11 DIAGNOSIS — I25.10: ICD-10-CM

## 2020-08-11 DIAGNOSIS — L89.156: ICD-10-CM

## 2020-08-11 DIAGNOSIS — Z51.5: ICD-10-CM

## 2020-08-11 DIAGNOSIS — F32.9: ICD-10-CM

## 2020-08-11 DIAGNOSIS — J12.89: ICD-10-CM

## 2020-08-11 DIAGNOSIS — J96.01: ICD-10-CM

## 2020-08-11 DIAGNOSIS — E03.9: ICD-10-CM

## 2020-08-11 DIAGNOSIS — U07.1: Primary | ICD-10-CM

## 2020-08-11 LAB
ALBUMIN SERPL BCP-MCNC: 2.5 G/DL (ref 3.4–5)
ALP SERPL-CCNC: 67 U/L (ref 46–116)
ALT SERPL W P-5'-P-CCNC: 7 U/L (ref 12–78)
AST SERPL W P-5'-P-CCNC: 16 U/L (ref 15–37)
BASE EXCESS BLDA CALC-SCNC: 2.6 MMOL/L
BASOPHILS # BLD AUTO: 0 /CMM (ref 0–0.2)
BASOPHILS NFR BLD AUTO: 0.1 % (ref 0–2)
BILIRUB DIRECT SERPL-MCNC: 0.2 MG/DL (ref 0–0.2)
BILIRUB SERPL-MCNC: 0.4 MG/DL (ref 0.2–1)
BUN SERPL-MCNC: 103 MG/DL (ref 7–18)
CALCIUM SERPL-MCNC: 10.9 MG/DL (ref 8.5–10.1)
CHLORIDE SERPL-SCNC: 116 MMOL/L (ref 98–107)
CK SERPL-CCNC: 19 U/L (ref 26–192)
CO2 SERPL-SCNC: 28 MMOL/L (ref 21–32)
CREAT SERPL-MCNC: 1.8 MG/DL (ref 0.6–1.3)
CRP SERPL-MCNC: 23.8 MG/DL (ref 0–0.9)
D DIMER PPP FEU-MCNC: 3.5 MG/L(FEU (ref 0.17–0.5)
DO-HGB MFR BLDA: 173.4 MMHG
EOSINOPHIL NFR BLD AUTO: 0.8 % (ref 0–6)
FERRITIN SERPL-MCNC: 122 NG/ML (ref 8–388)
GLUCOSE SERPL-MCNC: 222 MG/DL (ref 74–106)
HCT VFR BLD AUTO: 41 % (ref 33–45)
HGB BLD-MCNC: 12.6 G/DL (ref 11.5–14.8)
INHALED O2 CONCENTRATION: 40 %
INHALED O2 FLOW RATE: 5 L/MIN (ref 0–30)
LYMPHOCYTES NFR BLD AUTO: 0.7 /CMM (ref 0.8–4.8)
LYMPHOCYTES NFR BLD AUTO: 6 % (ref 20–44)
LYMPHOCYTES NFR BLD MANUAL: 7 % (ref 16–48)
MCHC RBC AUTO-ENTMCNC: 31 G/DL (ref 31–36)
MCV RBC AUTO: 84 FL (ref 82–100)
MONOCYTES NFR BLD AUTO: 0.4 /CMM (ref 0.1–1.3)
MONOCYTES NFR BLD AUTO: 3.7 % (ref 2–12)
MONOCYTES NFR BLD MANUAL: 2 % (ref 0–11)
NEUTROPHILS # BLD AUTO: 9.8 /CMM (ref 1.8–8.9)
NEUTROPHILS NFR BLD AUTO: 89.4 % (ref 43–81)
NEUTS SEG NFR BLD MANUAL: 91 % (ref 42–76)
NT-PROBNP SERPL-MCNC: 3555 PG/ML (ref 0–125)
PCO2 TEMP ADJ BLDA: 48.4 MMHG (ref 35–45)
PH TEMP ADJ BLDA: 7.39 [PH] (ref 7.35–7.45)
PLATELET # BLD AUTO: 441 /CMM (ref 150–450)
PO2 TEMP ADJ BLDA: 56.1 MMHG (ref 75–100)
POTASSIUM SERPL-SCNC: 4.6 MMOL/L (ref 3.5–5.1)
PROT SERPL-MCNC: 7.5 G/DL (ref 6.4–8.2)
RBC # BLD AUTO: 4.87 MIL/UL (ref 4–5.2)
SAO2 % BLDA: 86.5 % (ref 92–98.5)
SODIUM SERPL-SCNC: 153 MMOL/L (ref 136–145)
WBC NRBC COR # BLD AUTO: 10.9 K/UL (ref 4.3–11)

## 2020-08-11 PROCEDURE — A9563 P32 NA PHOSPHATE: HCPCS

## 2020-08-11 PROCEDURE — G0378 HOSPITAL OBSERVATION PER HR: HCPCS

## 2020-08-11 RX ADMIN — HUMAN INSULIN PRN UNIT: 100 INJECTION, SOLUTION SUBCUTANEOUS at 21:35

## 2020-08-11 RX ADMIN — MIRTAZAPINE SCH MG: 15 TABLET, FILM COATED ORAL at 21:40

## 2020-08-11 RX ADMIN — VALPROIC ACID SCH MG: 250 SOLUTION ORAL at 21:40

## 2020-08-11 RX ADMIN — DEXTROSE AND SODIUM CHLORIDE PRN MLS/HR: 5; 450 INJECTION, SOLUTION INTRAVENOUS at 21:23

## 2020-08-11 RX ADMIN — Medication SCH EACH: at 21:34

## 2020-08-11 RX ADMIN — ATORVASTATIN CALCIUM SCH MG: 40 TABLET, FILM COATED ORAL at 21:40

## 2020-08-11 NOTE — NUR
SIDNEY FROM SNF TO ER BED 5. PT IS LETHARGIC, RESPONDS TO PAIN STIMULI BY 
MOANING. TACHYPNEIC WITH NOTED PRODUCTIVE COUGH. PT IS REPORTED AS COVID 
POSITIVE. SHE IS DNR AS WELL. PT WAS BROUGHT IN FOR DESATURATED WHICH WAS 
REPORTED IN THE 80%S. PT WAS ALSO REPORTED AS HYPOTENSIVE, BP NOTED 85/50 UPON 
TRIAGE. PT IS PLACED ON 5LPM VIA NC SATTING @ 95%. MD WAS AT THE BEDSIDE FOR 
EVAL. ORDERS RECEIVED, NOTED AND CARRIED OUT. IV LINE OBTAINED ON R HAND 20G, 
BLOOD DRAWN AND GIVEN TO  AT BEDSIDE.

## 2020-08-11 NOTE — NUR
TRANSFER NOTE

Received patient from ER accompanied by 2 nurses via gurney to room 110. COVID Isolation 
protocol followed. On simple mask 8L o2 sat is 95%. BP 93/48, temp 98.3 HR 98 bpm. RR: 22. 
AO x0 obtunded responds to pain. Mcclain catheter draining cloudy yellow urine. D5NS running @ 
200 ml/hr on the R hand #20. No signs of distress. Safety measures implemented. Call light 
within reach. Side rails up x2. Bed locked on lowest position. Will endorse to night shift 
nurse for vanesa.

## 2020-08-11 NOTE — NUR
RN NOTES

RECEIVED LAB VALUES FOR FIBRINOGEN  CALLED PHARMACY ABOUT THE LOVENOX ORDER HE SAYS 
THAT IT IS SCHEDULE TO BE GIVEN TODAY @ 2100

## 2020-08-11 NOTE — NUR
RN NOTES

ORAL MEDICATION FOR 2200 NOT GIVEN BECAUSE PT IS VERY LETHARGIC AND FAILED SWALLOW TEST, 
VERY HIGH RISK FOR ASPIRATION, ST EVALUATION WAS ORDERED

## 2020-08-12 VITALS — SYSTOLIC BLOOD PRESSURE: 110 MMHG | DIASTOLIC BLOOD PRESSURE: 54 MMHG

## 2020-08-12 VITALS — DIASTOLIC BLOOD PRESSURE: 53 MMHG | SYSTOLIC BLOOD PRESSURE: 129 MMHG

## 2020-08-12 VITALS — SYSTOLIC BLOOD PRESSURE: 107 MMHG | DIASTOLIC BLOOD PRESSURE: 48 MMHG

## 2020-08-12 VITALS — SYSTOLIC BLOOD PRESSURE: 102 MMHG | DIASTOLIC BLOOD PRESSURE: 56 MMHG

## 2020-08-12 VITALS — DIASTOLIC BLOOD PRESSURE: 55 MMHG | SYSTOLIC BLOOD PRESSURE: 108 MMHG

## 2020-08-12 VITALS — DIASTOLIC BLOOD PRESSURE: 43 MMHG | SYSTOLIC BLOOD PRESSURE: 104 MMHG

## 2020-08-12 LAB
BASOPHILS # BLD AUTO: 0 /CMM (ref 0–0.2)
BASOPHILS NFR BLD AUTO: 0 % (ref 0–2)
BUN SERPL-MCNC: 97 MG/DL (ref 7–18)
CALCIUM SERPL-MCNC: 10.2 MG/DL (ref 8.5–10.1)
CHLORIDE SERPL-SCNC: 116 MMOL/L (ref 98–107)
CO2 SERPL-SCNC: 30 MMOL/L (ref 21–32)
CREAT SERPL-MCNC: 1.6 MG/DL (ref 0.6–1.3)
EOSINOPHIL NFR BLD AUTO: 0.1 % (ref 0–6)
GLUCOSE SERPL-MCNC: 334 MG/DL (ref 74–106)
HCT VFR BLD AUTO: 40 % (ref 33–45)
HGB BLD-MCNC: 12.1 G/DL (ref 11.5–14.8)
IRON SERPL-MCNC: 13 UG/DL (ref 50–175)
LYMPHOCYTES NFR BLD AUTO: 0.7 /CMM (ref 0.8–4.8)
LYMPHOCYTES NFR BLD AUTO: 6.2 % (ref 20–44)
MAGNESIUM SERPL-MCNC: 2.3 MG/DL (ref 1.8–2.4)
MCHC RBC AUTO-ENTMCNC: 30 G/DL (ref 31–36)
MCV RBC AUTO: 85 FL (ref 82–100)
MONOCYTES NFR BLD AUTO: 0.4 /CMM (ref 0.1–1.3)
MONOCYTES NFR BLD AUTO: 3 % (ref 2–12)
NEUTROPHILS # BLD AUTO: 10.7 /CMM (ref 1.8–8.9)
NEUTROPHILS NFR BLD AUTO: 90.7 % (ref 43–81)
PHOSPHATE SERPL-MCNC: 3.2 MG/DL (ref 2.5–4.9)
PLATELET # BLD AUTO: 399 /CMM (ref 150–450)
POTASSIUM SERPL-SCNC: 4 MMOL/L (ref 3.5–5.1)
RBC # BLD AUTO: 4.76 MIL/UL (ref 4–5.2)
SODIUM SERPL-SCNC: 153 MMOL/L (ref 136–145)
TIBC SERPL-MCNC: 134 UG/DL (ref 250–450)
TSH SERPL DL<=0.005 MIU/L-ACNC: 2.06 UIU/ML (ref 0.36–3.74)
WBC NRBC COR # BLD AUTO: 11.7 K/UL (ref 4.3–11)

## 2020-08-12 RX ADMIN — INSULIN HUMAN PRN UNIT: 100 INJECTION, SOLUTION PARENTERAL at 17:05

## 2020-08-12 RX ADMIN — DEXTROSE AND SODIUM CHLORIDE PRN MLS/HR: 5; 450 INJECTION, SOLUTION INTRAVENOUS at 20:40

## 2020-08-12 RX ADMIN — MIRTAZAPINE SCH MG: 15 TABLET, FILM COATED ORAL at 22:00

## 2020-08-12 RX ADMIN — OLOPATADINE HYDROCHLORIDE SCH ML: 1 SOLUTION/ DROPS OPHTHALMIC at 16:23

## 2020-08-12 RX ADMIN — ESCITALOPRAM OXALATE SCH MG: 10 TABLET, FILM COATED ORAL at 09:00

## 2020-08-12 RX ADMIN — VALPROIC ACID SCH MG: 250 SOLUTION ORAL at 22:00

## 2020-08-12 RX ADMIN — Medication SCH TAB: at 09:00

## 2020-08-12 RX ADMIN — ASPIRIN 81 MG SCH MG: 81 TABLET ORAL at 09:00

## 2020-08-12 RX ADMIN — CLOPIDOGREL BISULFATE SCH MG: 75 TABLET, FILM COATED ORAL at 09:00

## 2020-08-12 RX ADMIN — DEXTROSE AND SODIUM CHLORIDE PRN MLS/HR: 5; 450 INJECTION, SOLUTION INTRAVENOUS at 06:57

## 2020-08-12 RX ADMIN — INSULIN HUMAN PRN UNIT: 100 INJECTION, SOLUTION PARENTERAL at 08:04

## 2020-08-12 RX ADMIN — Medication SCH MG: at 09:00

## 2020-08-12 RX ADMIN — PIPERACILLIN SODIUM AND TAZOBACTAM SODIUM SCH MLS/HR: .375; 3 INJECTION, POWDER, LYOPHILIZED, FOR SOLUTION INTRAVENOUS at 20:35

## 2020-08-12 RX ADMIN — ENOXAPARIN SODIUM SCH MG: 30 INJECTION SUBCUTANEOUS at 20:40

## 2020-08-12 RX ADMIN — Medication SCH EACH: at 11:29

## 2020-08-12 RX ADMIN — LEVOTHYROXINE SODIUM SCH MCG: 75 TABLET ORAL at 07:30

## 2020-08-12 RX ADMIN — PIPERACILLIN SODIUM AND TAZOBACTAM SODIUM SCH MLS/HR: .375; 3 INJECTION, POWDER, LYOPHILIZED, FOR SOLUTION INTRAVENOUS at 11:22

## 2020-08-12 RX ADMIN — QUETIAPINE SCH MG: 25 TABLET, FILM COATED ORAL at 09:00

## 2020-08-12 RX ADMIN — Medication SCH MG: at 16:22

## 2020-08-12 RX ADMIN — PANTOPRAZOLE SODIUM SCH MG: 40 TABLET, DELAYED RELEASE ORAL at 21:00

## 2020-08-12 RX ADMIN — Medication SCH EACH: at 17:05

## 2020-08-12 RX ADMIN — INSULIN HUMAN PRN UNIT: 100 INJECTION, SOLUTION PARENTERAL at 11:28

## 2020-08-12 RX ADMIN — ATORVASTATIN CALCIUM SCH MG: 40 TABLET, FILM COATED ORAL at 22:00

## 2020-08-12 RX ADMIN — PIPERACILLIN SODIUM AND TAZOBACTAM SODIUM SCH MLS/HR: .375; 3 INJECTION, POWDER, LYOPHILIZED, FOR SOLUTION INTRAVENOUS at 03:45

## 2020-08-12 RX ADMIN — PANTOPRAZOLE SODIUM SCH MG: 40 TABLET, DELAYED RELEASE ORAL at 09:00

## 2020-08-12 RX ADMIN — HUMAN INSULIN PRN UNIT: 100 INJECTION, SOLUTION SUBCUTANEOUS at 22:19

## 2020-08-12 RX ADMIN — Medication SCH EACH: at 22:17

## 2020-08-12 RX ADMIN — Medication SCH EACH: at 08:06

## 2020-08-12 RX ADMIN — DEXAMETHASONE SODIUM PHOSPHATE SCH MG: 10 INJECTION INTRAMUSCULAR; INTRAVENOUS at 08:50

## 2020-08-12 RX ADMIN — QUETIAPINE SCH MG: 25 TABLET, FILM COATED ORAL at 16:22

## 2020-08-12 NOTE — NUR
RN TELE NOTES

PT IN BED, ASLEEP, EASY TO AROUSE, NON VERBAL, OPENS EYES AND MOANS AT TIMES, NO FACIAL 
GRIMACING, NO SOB, STILL AT 8LPM OF O2, NO SOB, SECRETIONS SUCTIONING DONE BY RT, SEEN BY 
SPEECH THERAPIST, WILL CONTINUE TO MONITOR.

## 2020-08-12 NOTE — NUR
RN TELE NOTES

PT IN BED, ASLEEP, EASY TO AROUSE, NO SIGN OF PAIN OR DISTRESS, NON VERBAL, ON O2 AT 8LPM 
VIA MASK, O2 SAT OF 99%,  NO SOB, IV FLUIDS INFUSING WELL, KEPT WARM AND COMFORTABLE IN BED.

## 2020-08-12 NOTE — NUR
RN CLOSING NOTES

PT ON BED ASLEEP AWAKE EASILY ON 8L 02 VIA MASK SPO2 98% TOLERATING WELL NO SIGN AND 
SYMPTOMS OF RESPIRATORY DISTRESS, TELE MONITOR READS SINUS RHYTHM DROPLET ISOLATION 
MAINTAINED TO R/O COVID NO SIGNIFICANT CHANGES ON CONDITION NOTED, ALL NEEDS ATTENDED SAFETY 
MEASURE MAINTAINED WILL ENDORSED TO AM SHIFT NURSE

## 2020-08-12 NOTE — NUR
patient daughter changed mind refused convalescent plasma because according to her her 
mother is admitted for uti /sepsis and has no covid.dr. snowden and dr. parrish 
notified.cancelled plasma order from lab.another rn witnessed by willow erfusal of blood 
plasma from daughter anna ruiz .

## 2020-08-12 NOTE — NUR
TELE RN NOTE

LAB CALLED AND INFORMED PT  COVID RESULT CAME POSITIVE, INFORMED GALLITO NP. CONTINUE TO 
MONITOR.

## 2020-08-12 NOTE — NUR
RN NOTES

SCHEDULED ORAL MEDS FOR 2200 NOT GIVEN PT STILL LETAHRGIC AND UNABLE TO SWALLOW, STILL FOR 
SWALLOW EVAL

## 2020-08-12 NOTE — NUR
WOUND CARE CONSULT: REVIEWED CHART, NURSING DOCUMENTATION AND PHOTOS WHICH INDICATE SACRAL 
DEEP TISSUE INJURY, PRESENT ON ADMISSION. RECOMMEND SURGICAL CONSULT. DR SALCIDO NOTIFIED 
OF CONSULT REQUEST. FIRST STEP LOW AIRLOSS MATTRESS IS ON ORDER. DISCUSSED SKIN PROTECTION 
WITH NURSING STAFF. WILL SEE PRN. MD IN AGREEMENT WITH PLAN OF CARE.

## 2020-08-12 NOTE — NUR
RN TELE NOTES

PT IN BED, RESTING, EASY TO AROUSE, OPENS EYES, NON VERBAL, NO SIGN OF PAIN OR DISTRESS, ON 
CONTINUOUS O2 AT 8LPM VIA MASK, KEPT HOB ELEVATED, PM CARE PROVIDED, F/C DRAINING WELL WITH 
CLEAR, YELLOW URINE, REPOSITIONED FOR COMFORT, KEPT CLEAN AND DRY, ALL NEEDS ATTENDED.

## 2020-08-12 NOTE — NUR
0710 DR. JONES IN THE UNIT AND NOTIFIED OF PATIENT'S EPISODE OF V TACH 14 BEATS AND HE SAID 
HE WILL LOOK AT PATIENT'S CHART.

## 2020-08-13 VITALS — DIASTOLIC BLOOD PRESSURE: 68 MMHG | SYSTOLIC BLOOD PRESSURE: 125 MMHG

## 2020-08-13 VITALS — SYSTOLIC BLOOD PRESSURE: 128 MMHG | DIASTOLIC BLOOD PRESSURE: 63 MMHG

## 2020-08-13 VITALS — DIASTOLIC BLOOD PRESSURE: 77 MMHG | SYSTOLIC BLOOD PRESSURE: 125 MMHG

## 2020-08-13 VITALS — SYSTOLIC BLOOD PRESSURE: 125 MMHG | DIASTOLIC BLOOD PRESSURE: 51 MMHG

## 2020-08-13 VITALS — SYSTOLIC BLOOD PRESSURE: 111 MMHG | DIASTOLIC BLOOD PRESSURE: 58 MMHG

## 2020-08-13 VITALS — SYSTOLIC BLOOD PRESSURE: 130 MMHG | DIASTOLIC BLOOD PRESSURE: 60 MMHG

## 2020-08-13 RX ADMIN — LEVOTHYROXINE SODIUM SCH MCG: 75 TABLET ORAL at 07:30

## 2020-08-13 RX ADMIN — PANTOPRAZOLE SODIUM SCH MG: 40 TABLET, DELAYED RELEASE ORAL at 21:00

## 2020-08-13 RX ADMIN — PIPERACILLIN SODIUM AND TAZOBACTAM SODIUM SCH MLS/HR: .375; 3 INJECTION, POWDER, LYOPHILIZED, FOR SOLUTION INTRAVENOUS at 04:14

## 2020-08-13 RX ADMIN — Medication SCH EACH: at 16:58

## 2020-08-13 RX ADMIN — Medication SCH EACH: at 12:18

## 2020-08-13 RX ADMIN — HUMAN INSULIN PRN UNIT: 100 INJECTION, SOLUTION SUBCUTANEOUS at 17:49

## 2020-08-13 RX ADMIN — PIPERACILLIN SODIUM AND TAZOBACTAM SODIUM SCH MLS/HR: .375; 3 INJECTION, POWDER, LYOPHILIZED, FOR SOLUTION INTRAVENOUS at 20:40

## 2020-08-13 RX ADMIN — QUETIAPINE SCH MG: 25 TABLET, FILM COATED ORAL at 08:33

## 2020-08-13 RX ADMIN — SODIUM CHLORIDE SCH MLS/HR: 9 INJECTION, SOLUTION INTRAVENOUS at 13:43

## 2020-08-13 RX ADMIN — Medication SCH EACH: at 07:30

## 2020-08-13 RX ADMIN — OLOPATADINE HYDROCHLORIDE SCH ML: 1 SOLUTION/ DROPS OPHTHALMIC at 09:32

## 2020-08-13 RX ADMIN — QUETIAPINE SCH MG: 25 TABLET, FILM COATED ORAL at 16:42

## 2020-08-13 RX ADMIN — Medication SCH TAB: at 08:33

## 2020-08-13 RX ADMIN — PIPERACILLIN SODIUM AND TAZOBACTAM SODIUM SCH MLS/HR: .375; 3 INJECTION, POWDER, LYOPHILIZED, FOR SOLUTION INTRAVENOUS at 12:11

## 2020-08-13 RX ADMIN — MIRTAZAPINE SCH MG: 15 TABLET, FILM COATED ORAL at 22:00

## 2020-08-13 RX ADMIN — VALPROIC ACID SCH MG: 250 SOLUTION ORAL at 22:00

## 2020-08-13 RX ADMIN — Medication SCH MG: at 16:42

## 2020-08-13 RX ADMIN — HUMAN INSULIN PRN UNIT: 100 INJECTION, SOLUTION SUBCUTANEOUS at 21:58

## 2020-08-13 RX ADMIN — PANTOPRAZOLE SODIUM SCH MG: 40 TABLET, DELAYED RELEASE ORAL at 08:33

## 2020-08-13 RX ADMIN — Medication SCH EACH: at 22:58

## 2020-08-13 RX ADMIN — ENOXAPARIN SODIUM SCH MG: 30 INJECTION SUBCUTANEOUS at 20:43

## 2020-08-13 RX ADMIN — DEXTROSE AND SODIUM CHLORIDE PRN MLS/HR: 5; 450 INJECTION, SOLUTION INTRAVENOUS at 12:18

## 2020-08-13 RX ADMIN — HUMAN INSULIN PRN UNIT: 100 INJECTION, SOLUTION SUBCUTANEOUS at 08:25

## 2020-08-13 RX ADMIN — OLOPATADINE HYDROCHLORIDE SCH ML: 1 SOLUTION/ DROPS OPHTHALMIC at 16:58

## 2020-08-13 RX ADMIN — Medication SCH MG: at 08:33

## 2020-08-13 RX ADMIN — ESCITALOPRAM OXALATE SCH MG: 10 TABLET, FILM COATED ORAL at 08:33

## 2020-08-13 RX ADMIN — ATORVASTATIN CALCIUM SCH MG: 40 TABLET, FILM COATED ORAL at 22:00

## 2020-08-13 RX ADMIN — CLOPIDOGREL BISULFATE SCH MG: 75 TABLET, FILM COATED ORAL at 08:33

## 2020-08-13 RX ADMIN — ASPIRIN 81 MG SCH MG: 81 TABLET ORAL at 08:32

## 2020-08-13 RX ADMIN — INSULIN HUMAN PRN UNIT: 100 INJECTION, SOLUTION PARENTERAL at 12:14

## 2020-08-13 RX ADMIN — DEXAMETHASONE SODIUM PHOSPHATE SCH MG: 10 INJECTION INTRAMUSCULAR; INTRAVENOUS at 09:31

## 2020-08-13 NOTE — NUR
Patient is removing mask constantly, desaturating to 83%, tried to reorient her multiple 
times, still removing  Will notify MD and charge nurse

## 2020-08-13 NOTE — NUR
TELE1/RN

RECEIVED PATIENT AWAKE, CONFUSED, RESTLESS, NO SIGNS OF DISTRESS NOTED, ON 15L NON 
REBREATHER MASK, IVF INFUSING, LEFT SOFT WRIST RESTRAINT IN PLACE TO PREVENT FROM REMOVING 
LINES AND F/C HOB ELEVATED, CALL LIGHT IN REACH, NEEDS ATTENDED, WILL MONITOR.

## 2020-08-13 NOTE — NUR
RN CLOSING NOTES

PT ON BED ASLEEP AWAKE EASILY ON 8L 02 VIA MASK SPO2 98% TOLERATING WELL NO SIGN AND 
SYMPTOMS OF RESPIRATORY DISTRESS, TELE MONITOR READS SINUS RHYTHM DROPLET ISOLATION 
MAINTAINED  FOR COVID (+)  NO SIGNIFICANT CHANGES ON CONDITION NOTED, ALL NEEDS ATTENDED 
SAFETY MEASURE MAINTAINED WILL ENDORSED TO AM SHIFT NURSE


-------------------------------------------------------------------------------

Addendum: 08/13/20 at 0658 by TRUE ROB RN

-------------------------------------------------------------------------------

PT IS ON NON REBREATHER MASK 15L DUE TO SUDDEN DE SATURATION ASK RT TO SUCTION PT TO REMOVED 
MUCOUS PLUG

## 2020-08-13 NOTE — NUR
TELE1/RN

PATIENT IS SLEEPING AT THIS TIME, APPEAR COMFORTABLE, NO SIGNS OF DISTRESS NOTED, CALL LIGHT 
IN REACH. WILL CONTINUE TO MONITOR.

## 2020-08-13 NOTE — NUR
RN OPENING NOTES

Received patient in bed, non verbal, A/Ox0, on NNB mask @15 L of O2, SPO2 94%, no s/sx of 
resp distress noted, tolerating well, IV lines no r and L hand noted, intact and patent, 
Mcclain cath in place draining yellow clear urine by gravity,skin is intact, bed in lowest 
position, HOB elevated, side rails up x2, safety measures implemented, call light in reach , 
will cont to monitor

## 2020-08-13 NOTE — NUR
RN CLOSING NOTES

Patient remains in bed, tolerating same settings well, no s/sx of resp distress noted, no 
SOB, SPO2 93% IV lines are patent and flushed, running D5 1/2 NS @ 100 cc/hr, safety 
measures implemented, call light in reach, HOB elevated, will endorse to PM shift nurse for 
USMAN

## 2020-08-13 NOTE — NUR
BOTH iv LINES ARE OCCLUDED, TRIED TO RESTART MULTIPLE ATTEMPT, PLACED TWO SUCCESSFULLY, BUT 
MIDLINE STILL NEED IT, PLACED AN ORDER  ,

## 2020-08-14 VITALS — SYSTOLIC BLOOD PRESSURE: 126 MMHG | DIASTOLIC BLOOD PRESSURE: 66 MMHG

## 2020-08-14 VITALS — SYSTOLIC BLOOD PRESSURE: 147 MMHG | DIASTOLIC BLOOD PRESSURE: 73 MMHG

## 2020-08-14 VITALS — SYSTOLIC BLOOD PRESSURE: 147 MMHG | DIASTOLIC BLOOD PRESSURE: 67 MMHG

## 2020-08-14 VITALS — SYSTOLIC BLOOD PRESSURE: 135 MMHG | DIASTOLIC BLOOD PRESSURE: 65 MMHG

## 2020-08-14 VITALS — SYSTOLIC BLOOD PRESSURE: 134 MMHG | DIASTOLIC BLOOD PRESSURE: 59 MMHG

## 2020-08-14 VITALS — DIASTOLIC BLOOD PRESSURE: 74 MMHG | SYSTOLIC BLOOD PRESSURE: 128 MMHG

## 2020-08-14 LAB
BASOPHILS # BLD AUTO: 0 /CMM (ref 0–0.2)
BASOPHILS NFR BLD AUTO: 0.1 % (ref 0–2)
BUN SERPL-MCNC: 72 MG/DL (ref 7–18)
CALCIUM SERPL-MCNC: 10.7 MG/DL (ref 8.5–10.1)
CHLORIDE SERPL-SCNC: 116 MMOL/L (ref 98–107)
CO2 SERPL-SCNC: 25 MMOL/L (ref 21–32)
CREAT SERPL-MCNC: 1.3 MG/DL (ref 0.6–1.3)
EOSINOPHIL NFR BLD AUTO: 0.1 % (ref 0–6)
GLUCOSE SERPL-MCNC: 351 MG/DL (ref 74–106)
HCT VFR BLD AUTO: 42 % (ref 33–45)
HGB BLD-MCNC: 12.7 G/DL (ref 11.5–14.8)
LYMPHOCYTES NFR BLD AUTO: 0.5 /CMM (ref 0.8–4.8)
LYMPHOCYTES NFR BLD AUTO: 4 % (ref 20–44)
MAGNESIUM SERPL-MCNC: 2.2 MG/DL (ref 1.8–2.4)
MCHC RBC AUTO-ENTMCNC: 31 G/DL (ref 31–36)
MCV RBC AUTO: 83 FL (ref 82–100)
MONOCYTES NFR BLD AUTO: 0.3 /CMM (ref 0.1–1.3)
MONOCYTES NFR BLD AUTO: 2.6 % (ref 2–12)
NEUTROPHILS # BLD AUTO: 11.7 /CMM (ref 1.8–8.9)
NEUTROPHILS NFR BLD AUTO: 93.2 % (ref 43–81)
PHOSPHATE SERPL-MCNC: 2.5 MG/DL (ref 2.5–4.9)
PLATELET # BLD AUTO: 447 /CMM (ref 150–450)
POTASSIUM SERPL-SCNC: 3.4 MMOL/L (ref 3.5–5.1)
RBC # BLD AUTO: 4.99 MIL/UL (ref 4–5.2)
SODIUM SERPL-SCNC: 152 MMOL/L (ref 136–145)
WBC NRBC COR # BLD AUTO: 12.5 K/UL (ref 4.3–11)

## 2020-08-14 RX ADMIN — INSULIN HUMAN PRN UNIT: 100 INJECTION, SOLUTION PARENTERAL at 18:14

## 2020-08-14 RX ADMIN — QUETIAPINE SCH MG: 25 TABLET, FILM COATED ORAL at 08:09

## 2020-08-14 RX ADMIN — QUETIAPINE SCH MG: 25 TABLET, FILM COATED ORAL at 16:32

## 2020-08-14 RX ADMIN — Medication SCH EACH: at 18:12

## 2020-08-14 RX ADMIN — PIPERACILLIN SODIUM AND TAZOBACTAM SODIUM SCH MLS/HR: .375; 3 INJECTION, POWDER, LYOPHILIZED, FOR SOLUTION INTRAVENOUS at 11:13

## 2020-08-14 RX ADMIN — Medication SCH EACH: at 11:59

## 2020-08-14 RX ADMIN — Medication PRN MG: at 23:34

## 2020-08-14 RX ADMIN — DEXAMETHASONE SODIUM PHOSPHATE SCH MG: 10 INJECTION INTRAMUSCULAR; INTRAVENOUS at 08:07

## 2020-08-14 RX ADMIN — DEXTROSE MONOHYDRATE PRN MLS/HR: 50 INJECTION, SOLUTION INTRAVENOUS at 09:58

## 2020-08-14 RX ADMIN — HUMAN INSULIN PRN UNIT: 100 INJECTION, SOLUTION SUBCUTANEOUS at 12:15

## 2020-08-14 RX ADMIN — CLOPIDOGREL BISULFATE SCH MG: 75 TABLET, FILM COATED ORAL at 08:09

## 2020-08-14 RX ADMIN — PIPERACILLIN SODIUM AND TAZOBACTAM SODIUM SCH MLS/HR: .375; 3 INJECTION, POWDER, LYOPHILIZED, FOR SOLUTION INTRAVENOUS at 04:21

## 2020-08-14 RX ADMIN — DEXTROSE AND SODIUM CHLORIDE PRN MLS/HR: 5; 450 INJECTION, SOLUTION INTRAVENOUS at 03:25

## 2020-08-14 RX ADMIN — Medication SCH TAB: at 08:09

## 2020-08-14 RX ADMIN — VALPROIC ACID SCH MG: 250 SOLUTION ORAL at 22:00

## 2020-08-14 RX ADMIN — INSULIN HUMAN PRN UNIT: 100 INJECTION, SOLUTION PARENTERAL at 06:45

## 2020-08-14 RX ADMIN — ESCITALOPRAM OXALATE SCH MG: 10 TABLET, FILM COATED ORAL at 08:07

## 2020-08-14 RX ADMIN — OLOPATADINE HYDROCHLORIDE SCH ML: 1 SOLUTION/ DROPS OPHTHALMIC at 08:04

## 2020-08-14 RX ADMIN — POTASSIUM CHLORIDE SCH MLS/HR: 200 INJECTION, SOLUTION INTRAVENOUS at 10:46

## 2020-08-14 RX ADMIN — MIRTAZAPINE SCH MG: 15 TABLET, FILM COATED ORAL at 22:00

## 2020-08-14 RX ADMIN — PIPERACILLIN SODIUM AND TAZOBACTAM SODIUM SCH MLS/HR: .375; 3 INJECTION, POWDER, LYOPHILIZED, FOR SOLUTION INTRAVENOUS at 20:06

## 2020-08-14 RX ADMIN — POTASSIUM CHLORIDE SCH MLS/HR: 200 INJECTION, SOLUTION INTRAVENOUS at 11:59

## 2020-08-14 RX ADMIN — LEVOTHYROXINE SODIUM SCH MCG: 75 TABLET ORAL at 07:30

## 2020-08-14 RX ADMIN — Medication SCH EACH: at 23:24

## 2020-08-14 RX ADMIN — PANTOPRAZOLE SODIUM SCH MG: 40 TABLET, DELAYED RELEASE ORAL at 21:00

## 2020-08-14 RX ADMIN — HUMAN INSULIN PRN UNIT: 100 INJECTION, SOLUTION SUBCUTANEOUS at 23:23

## 2020-08-14 RX ADMIN — OLOPATADINE HYDROCHLORIDE SCH ML: 1 SOLUTION/ DROPS OPHTHALMIC at 16:31

## 2020-08-14 RX ADMIN — ATORVASTATIN CALCIUM SCH MG: 40 TABLET, FILM COATED ORAL at 22:00

## 2020-08-14 RX ADMIN — DEXTROSE MONOHYDRATE PRN MLS/HR: 50 INJECTION, SOLUTION INTRAVENOUS at 20:09

## 2020-08-14 RX ADMIN — Medication SCH MG: at 16:31

## 2020-08-14 RX ADMIN — Medication SCH MG: at 08:07

## 2020-08-14 RX ADMIN — ASPIRIN 81 MG SCH MG: 81 TABLET ORAL at 08:07

## 2020-08-14 RX ADMIN — ENOXAPARIN SODIUM SCH MG: 30 INJECTION SUBCUTANEOUS at 20:09

## 2020-08-14 RX ADMIN — SODIUM CHLORIDE SCH MLS/HR: 9 INJECTION, SOLUTION INTRAVENOUS at 14:24

## 2020-08-14 RX ADMIN — PANTOPRAZOLE SODIUM SCH MG: 40 TABLET, DELAYED RELEASE ORAL at 08:09

## 2020-08-14 RX ADMIN — Medication SCH EACH: at 06:52

## 2020-08-14 NOTE — NUR
TELE RN OPENING NOTES



RECEIVED PT RESTING IN BED WITH MILD SHORTNESS OF BREATH. ON DROPLET ON CONTACT PRECAUTION 
FOR COVID PNA. PT IS ON RNB 15 LMP WITH 02 SAT TEMPORARILY DIPPING TO 85% WITH HIGHER LEVELS 
AT 95%. NSR 80 WITH EPISODES OF SINUS ARRHYTHMIA. PT HAS A DOAN CATHETER WITH OUTPUT THAT 
LOOKS CLEAR AND NO SINGS OF BLEEDING OR INFECTION. PT WAS REPOSITIONED WITH HOB ELEVATED. 
22G L HAND IS PATIENT, AND FLUSHED WITH NO SIGNS OF INFILTRATION. CHUCHO IS PATIENT AND FLUSHED 
WITH NO SIGNS OF INFILTRATION. 1 BED IS IN LOWEST AND LOCKED POSITION. WITH CALL BELL IN 
REACH. WILL CONTINUE TO MONITOR.

## 2020-08-14 NOTE — NUR
RN/ALEX



PATIENT IN BED. NO ACUTE DISTRESS NOTED. PATIENT AWAKE, BUT NONVERBAL. OPENS EYES AND LOOKS 
AT ME, BUT DOES NOT ACKNOWLEDGE COMMANDS. PATIENT ON 15L OXYGEN VIA NON-REBREATHER MASK, 
SATURATING WELL AT 95%. PATIENT ON TELEMETRY MONITOR, SINUS RHYTHM NOTED. PATIENT RIGHT 
UPPER ARM MIDLINE IN PLACE, INTACT, PATENT, FLUSHED WELL. PATIENT DOAN CATHETER IN PLACE, 
INTACT, PATENT, DRAINING TO GRAVITY. PATIENT SOFT LEFT WRIST RESTRAINT IN PLACE, SAFETY 
MAINATINED. SATURATING WELL AT 95%/ PATIENT SAFETY MAINTIANED. NPO ACKNOWLEDGED. CALL LIGHT 
WITHIN REACH. WILL ENDORSE PLAN OF CARE TO ONCOMING NURSE FOR CONTINUITY OF CARE

## 2020-08-14 NOTE — NUR
TELE1/RN

PATIENT IS SLEEPING, APPEAR COMFORTABLE, NO SIGNS OF DISTRESS NOTED, CALL LIGHT IN REACH, 
ALL NEEDS ATTENDED AT THIS TIME, WILL CONTINUE TO MONITOR.

## 2020-08-14 NOTE — NUR
RN/ALEX



RECEIVED PATIENT IN BED. NO ACUTE DISTRESS NOTED. PATIENT AWAKE, BUT NONVERBAL. OPENS EYES 
AND LOOKS AT ME, BUT DOES NOT ACKNOWLEDGE COMMANDS. PATIENT ON 15L OXYGEN VIA NON-REBREATHER 
MASK, SATURATING WELL AT 94%. PATIENT ON TELEMETRY MONITOR, SINUS RHYTHM NOTED. PATIENT 
RIGHT UPPER ARM MIDLINE IN PLACE, INTACT, PATENT, FLUSHED WELL. PATIENT DOAN CATHETER IN 
PLACE, INTACT, PATENT, DRAINING TO GRAVITY. PATIENT SAFETY MAINTIANED. NPO ACKNOWLEDGED. 
CALL LIGHT WITHIN REACH. WILL CONTINUE TO MONITOR. 

-------------------------------------------------------------------------------

Addendum: 08/14/20 at 0940 by BETHANY MORE RN

-------------------------------------------------------------------------------

ADDENDUM- PATIENT SOFT WRIST RESTRAINT ON LEFT WRIST. SAFETY MAINTAINED. SATURATING WELL AT 
94%. WILL CONTINUE TO MONITOR.

## 2020-08-14 NOTE — NUR
RN/ALEX



PATIENT SWALLOW EVAL FAILED 2X. DUE TO HIGH ASPIRATION RISK, NON-ADMINISTER PO MEDS. WILL 
NOTIFY DOCTOR.

## 2020-08-14 NOTE — NUR
RN/ALEX



NOTIFIED  ABOUT FAILED SWALLOW EVAL AND NON-ADMINISTER PO MEDS. DOCTOR RECOMMENDED ORDER 
FOR NG-TUBE, HOWEVER CONTACTED PATIENT FAMILY, FIGUEROA CONTI (GRAND-DAUGHTER), AND THEY 
REFUSED ANY NG-TUBE/G-TUBE.



NOTIFIED DOCTOR OF FAMILY'S DECISION.

## 2020-08-15 VITALS — DIASTOLIC BLOOD PRESSURE: 68 MMHG | SYSTOLIC BLOOD PRESSURE: 129 MMHG

## 2020-08-15 VITALS — DIASTOLIC BLOOD PRESSURE: 69 MMHG | SYSTOLIC BLOOD PRESSURE: 150 MMHG

## 2020-08-15 VITALS — SYSTOLIC BLOOD PRESSURE: 118 MMHG | DIASTOLIC BLOOD PRESSURE: 86 MMHG

## 2020-08-15 VITALS — DIASTOLIC BLOOD PRESSURE: 78 MMHG | SYSTOLIC BLOOD PRESSURE: 133 MMHG

## 2020-08-15 VITALS — DIASTOLIC BLOOD PRESSURE: 78 MMHG | SYSTOLIC BLOOD PRESSURE: 130 MMHG

## 2020-08-15 VITALS — SYSTOLIC BLOOD PRESSURE: 121 MMHG | DIASTOLIC BLOOD PRESSURE: 70 MMHG

## 2020-08-15 LAB
ALBUMIN SERPL BCP-MCNC: 1.9 G/DL (ref 3.4–5)
ALP SERPL-CCNC: 96 U/L (ref 46–116)
ALT SERPL W P-5'-P-CCNC: 10 U/L (ref 12–78)
AST SERPL W P-5'-P-CCNC: 21 U/L (ref 15–37)
BASOPHILS # BLD AUTO: 0 /CMM (ref 0–0.2)
BASOPHILS NFR BLD AUTO: 0.1 % (ref 0–2)
BILIRUB SERPL-MCNC: 0.5 MG/DL (ref 0.2–1)
BUN SERPL-MCNC: 55 MG/DL (ref 7–18)
CALCIUM SERPL-MCNC: 10.9 MG/DL (ref 8.5–10.1)
CHLORIDE SERPL-SCNC: 114 MMOL/L (ref 98–107)
CO2 SERPL-SCNC: 24 MMOL/L (ref 21–32)
CREAT SERPL-MCNC: 1 MG/DL (ref 0.6–1.3)
EOSINOPHIL NFR BLD AUTO: 0 % (ref 0–6)
GLUCOSE SERPL-MCNC: 286 MG/DL (ref 74–106)
HCT VFR BLD AUTO: 41 % (ref 33–45)
HGB BLD-MCNC: 12.6 G/DL (ref 11.5–14.8)
LYMPHOCYTES NFR BLD AUTO: 0.5 /CMM (ref 0.8–4.8)
LYMPHOCYTES NFR BLD AUTO: 3.9 % (ref 20–44)
MAGNESIUM SERPL-MCNC: 2 MG/DL (ref 1.8–2.4)
MCHC RBC AUTO-ENTMCNC: 31 G/DL (ref 31–36)
MCV RBC AUTO: 82 FL (ref 82–100)
MONOCYTES NFR BLD AUTO: 0.3 /CMM (ref 0.1–1.3)
MONOCYTES NFR BLD AUTO: 2.6 % (ref 2–12)
NEUTROPHILS # BLD AUTO: 12.5 /CMM (ref 1.8–8.9)
NEUTROPHILS NFR BLD AUTO: 93.4 % (ref 43–81)
PHOSPHATE SERPL-MCNC: 2.4 MG/DL (ref 2.5–4.9)
PLATELET # BLD AUTO: 388 /CMM (ref 150–450)
POTASSIUM SERPL-SCNC: 3.5 MMOL/L (ref 3.5–5.1)
PROT SERPL-MCNC: 6.9 G/DL (ref 6.4–8.2)
RBC # BLD AUTO: 4.98 MIL/UL (ref 4–5.2)
SODIUM SERPL-SCNC: 148 MMOL/L (ref 136–145)
WBC NRBC COR # BLD AUTO: 13.4 K/UL (ref 4.3–11)

## 2020-08-15 RX ADMIN — VALPROIC ACID SCH MG: 250 SOLUTION ORAL at 21:02

## 2020-08-15 RX ADMIN — SODIUM CHLORIDE SCH MLS/HR: 9 INJECTION, SOLUTION INTRAVENOUS at 14:22

## 2020-08-15 RX ADMIN — Medication SCH EACH: at 22:06

## 2020-08-15 RX ADMIN — HUMAN INSULIN PRN UNIT: 100 INJECTION, SOLUTION SUBCUTANEOUS at 08:52

## 2020-08-15 RX ADMIN — PANTOPRAZOLE SODIUM SCH MG: 40 TABLET, DELAYED RELEASE ORAL at 20:41

## 2020-08-15 RX ADMIN — CLOPIDOGREL BISULFATE SCH MG: 75 TABLET, FILM COATED ORAL at 09:00

## 2020-08-15 RX ADMIN — PIPERACILLIN SODIUM AND TAZOBACTAM SODIUM SCH MLS/HR: .375; 3 INJECTION, POWDER, LYOPHILIZED, FOR SOLUTION INTRAVENOUS at 03:29

## 2020-08-15 RX ADMIN — DEXAMETHASONE SODIUM PHOSPHATE SCH MG: 10 INJECTION INTRAMUSCULAR; INTRAVENOUS at 09:50

## 2020-08-15 RX ADMIN — QUETIAPINE SCH MG: 25 TABLET, FILM COATED ORAL at 17:00

## 2020-08-15 RX ADMIN — Medication SCH EACH: at 18:29

## 2020-08-15 RX ADMIN — LEVOTHYROXINE SODIUM SCH MCG: 75 TABLET ORAL at 07:30

## 2020-08-15 RX ADMIN — SODIUM CHLORIDE SCH MLS/HR: 9 INJECTION, SOLUTION INTRAVENOUS at 20:33

## 2020-08-15 RX ADMIN — QUETIAPINE SCH MG: 25 TABLET, FILM COATED ORAL at 09:00

## 2020-08-15 RX ADMIN — Medication SCH EACH: at 11:58

## 2020-08-15 RX ADMIN — OLOPATADINE HYDROCHLORIDE SCH ML: 1 SOLUTION/ DROPS OPHTHALMIC at 09:00

## 2020-08-15 RX ADMIN — MIRTAZAPINE SCH MG: 15 TABLET, FILM COATED ORAL at 21:02

## 2020-08-15 RX ADMIN — ENOXAPARIN SODIUM SCH MG: 30 INJECTION SUBCUTANEOUS at 20:34

## 2020-08-15 RX ADMIN — Medication SCH EACH: at 08:32

## 2020-08-15 RX ADMIN — ATORVASTATIN CALCIUM SCH MG: 40 TABLET, FILM COATED ORAL at 21:02

## 2020-08-15 RX ADMIN — DEXTROSE MONOHYDRATE PRN MLS/HR: 50 INJECTION, SOLUTION INTRAVENOUS at 06:02

## 2020-08-15 RX ADMIN — PIPERACILLIN SODIUM AND TAZOBACTAM SODIUM SCH MLS/HR: .375; 3 INJECTION, POWDER, LYOPHILIZED, FOR SOLUTION INTRAVENOUS at 12:29

## 2020-08-15 RX ADMIN — Medication SCH MG: at 09:00

## 2020-08-15 RX ADMIN — DEXTROSE MONOHYDRATE PRN MLS/HR: 50 INJECTION, SOLUTION INTRAVENOUS at 23:26

## 2020-08-15 RX ADMIN — PANTOPRAZOLE SODIUM SCH MG: 40 TABLET, DELAYED RELEASE ORAL at 09:00

## 2020-08-15 RX ADMIN — OLOPATADINE HYDROCHLORIDE SCH ML: 1 SOLUTION/ DROPS OPHTHALMIC at 16:51

## 2020-08-15 RX ADMIN — INSULIN HUMAN PRN UNIT: 100 INJECTION, SOLUTION PARENTERAL at 12:10

## 2020-08-15 RX ADMIN — Medication SCH TAB: at 09:00

## 2020-08-15 RX ADMIN — SODIUM CHLORIDE SCH MLS/HR: 9 INJECTION, SOLUTION INTRAVENOUS at 16:50

## 2020-08-15 RX ADMIN — PIPERACILLIN SODIUM AND TAZOBACTAM SODIUM SCH MLS/HR: .375; 3 INJECTION, POWDER, LYOPHILIZED, FOR SOLUTION INTRAVENOUS at 21:02

## 2020-08-15 RX ADMIN — HUMAN INSULIN PRN UNIT: 100 INJECTION, SOLUTION SUBCUTANEOUS at 18:32

## 2020-08-15 RX ADMIN — ASPIRIN 81 MG SCH MG: 81 TABLET ORAL at 09:00

## 2020-08-15 RX ADMIN — Medication SCH MG: at 17:00

## 2020-08-15 RX ADMIN — ESCITALOPRAM OXALATE SCH MG: 10 TABLET, FILM COATED ORAL at 09:00

## 2020-08-15 NOTE — NUR
TELE RN CLOSING NOTES



PT IS CURRENTLY SLEEPING WITH NO S/S OF DISTRESS. BUT HAS BEEN AWAKE THROUGHOUT THE NIGHT. 
SHE IS ON NRB MASK AT 15LPM WITH AN O2 SAT OF 96%. SHE DID HOWEVER HAVE MULTIPLE DROPS IN 
HER 02 SAT AS LOW AS 82%. SHE HAD AUDIBLE RHONCHI AND DEEP NASAL SUCTION WAS PROVIDED BY 
RESPIRATORY THERAPY WITH AN INCREASE IN COMFORT AND IMPROVEMENT OF BREATH SOUNDS. SHE IS 
NSR/SINUS ARRHYTHMIA WITH A HR OF 72. MIDLINE 18G CHUCHO IS INFUSING ZYSON AT 25ML/HR AND 22 G 
LEFT HAND IS INFUSING D5W AT 100ML/HR. BOTH LINES HAVE NO SIGNS OF INFILTRATION OR 
INFECTION. ORAL CARE WAS PROVIDED, PATIENT WAS TURNED EVERY 2 HOURS, AND BED BATH COMPLETED 
AND MEPILEX CHANGED ON SACRUM. BED IN LOWEST AND LOCKED POSITION WITH CALL LIGHT IN REACH 
AND BED ALARM ACTIVATED. WILL ENDORSE TO ONCOMING RN.

## 2020-08-15 NOTE — NUR
RN OPENING NOTES:

RECEIVED PATIENT IN BED SLEEPING, WITH NO SIGNS OF ANY RESPIRATORY DISTRESS, SOB, DIFFICULTY 
BREATHING OR ANY PAIN. PT IS ON NRB 15L, O2 96%. PT IS ON TELE MON NSR. IV L HAND #22, 
MIDLINE CHUCHO #18, INTACT PATENT AND WELL FLUSHED. COMFORT MEASURES TAKEN, SAFETY PRECAUTIONS 
MAINTAINED, CALL LIGHT WITHIN REACH, WILL CONTINUE TO MONITOR.

## 2020-08-15 NOTE — NUR
RN CLOSING NOTES:

 PATIENT IN BED, CONFUSED, WITH NRB 15L O2 SAT 97%, NO DIFFICULTY BREATHING, O2 DROPS WHEN 
MASK IS REMOVED. PT IS ON TELE MONITOR NRS WITH NO ARRHYTHMIAS. NO SIGNS OF ANY PAIN ALL 
COMFORT MEASURES TAKEN. PT STILL NPO, NO PO MEDS DUE TO FAILED SWALLOW TEST, ONLY IV MEDS 
GIVEN. PTS IV SITE PATENT INTACT, IV WELL FLUSHED. PATIENTS SAFETY MEASURES MAINTAINED, CALL 
LIGHT WITHIN REACH. WILL ENDORSE TO PM NURSE FOR CONTINUATION OF CARE.

## 2020-08-15 NOTE — NUR
RN NOTES:

PATIENT 0900AM PO MEDS WERE NOT ADMIN DUE TO ASPIRATION PRECAUTION AND FAILING THE SWALLOW 
TEST

## 2020-08-15 NOTE — NUR
RN TELE NOTES

PT HAVING INCREASED DIFFICULTY CLEARING SECRETIONS. DEEP SUCTION WAS PERFORMED AND TOLERATED 
WELL BY RESPIRATORY THERAPY WITH INCREASED COMFORT AFTER THE PROCEDURE. WILL CONTINUE TO 
MONITOR.

## 2020-08-16 VITALS — SYSTOLIC BLOOD PRESSURE: 111 MMHG | DIASTOLIC BLOOD PRESSURE: 94 MMHG

## 2020-08-16 VITALS — DIASTOLIC BLOOD PRESSURE: 48 MMHG | SYSTOLIC BLOOD PRESSURE: 90 MMHG

## 2020-08-16 VITALS — DIASTOLIC BLOOD PRESSURE: 94 MMHG | SYSTOLIC BLOOD PRESSURE: 111 MMHG

## 2020-08-16 VITALS — SYSTOLIC BLOOD PRESSURE: 118 MMHG | DIASTOLIC BLOOD PRESSURE: 76 MMHG

## 2020-08-16 VITALS — SYSTOLIC BLOOD PRESSURE: 146 MMHG | DIASTOLIC BLOOD PRESSURE: 80 MMHG

## 2020-08-16 VITALS — DIASTOLIC BLOOD PRESSURE: 63 MMHG | SYSTOLIC BLOOD PRESSURE: 143 MMHG

## 2020-08-16 LAB
ALBUMIN SERPL BCP-MCNC: 1.8 G/DL (ref 3.4–5)
ALP SERPL-CCNC: 94 U/L (ref 46–116)
ALT SERPL W P-5'-P-CCNC: 9 U/L (ref 12–78)
AST SERPL W P-5'-P-CCNC: 17 U/L (ref 15–37)
BASOPHILS # BLD AUTO: 0 /CMM (ref 0–0.2)
BASOPHILS NFR BLD AUTO: 0.3 % (ref 0–2)
BILIRUB SERPL-MCNC: 0.6 MG/DL (ref 0.2–1)
BUN SERPL-MCNC: 44 MG/DL (ref 7–18)
CALCIUM SERPL-MCNC: 10.8 MG/DL (ref 8.5–10.1)
CHLORIDE SERPL-SCNC: 112 MMOL/L (ref 98–107)
CO2 SERPL-SCNC: 25 MMOL/L (ref 21–32)
CREAT SERPL-MCNC: 0.9 MG/DL (ref 0.6–1.3)
EOSINOPHIL NFR BLD AUTO: 0.1 % (ref 0–6)
GLUCOSE SERPL-MCNC: 172 MG/DL (ref 74–106)
HCT VFR BLD AUTO: 40 % (ref 33–45)
HGB BLD-MCNC: 12.4 G/DL (ref 11.5–14.8)
LYMPHOCYTES NFR BLD AUTO: 0.4 /CMM (ref 0.8–4.8)
LYMPHOCYTES NFR BLD AUTO: 2.9 % (ref 20–44)
MAGNESIUM SERPL-MCNC: 1.9 MG/DL (ref 1.8–2.4)
MCHC RBC AUTO-ENTMCNC: 31 G/DL (ref 31–36)
MCV RBC AUTO: 80 FL (ref 82–100)
MONOCYTES NFR BLD AUTO: 0.4 /CMM (ref 0.1–1.3)
MONOCYTES NFR BLD AUTO: 3.1 % (ref 2–12)
NEUTROPHILS # BLD AUTO: 13 /CMM (ref 1.8–8.9)
NEUTROPHILS NFR BLD AUTO: 93.6 % (ref 43–81)
PHOSPHATE SERPL-MCNC: 2.6 MG/DL (ref 2.5–4.9)
PLATELET # BLD AUTO: 398 /CMM (ref 150–450)
POTASSIUM SERPL-SCNC: 3.4 MMOL/L (ref 3.5–5.1)
PROT SERPL-MCNC: 7.8 G/DL (ref 6.4–8.2)
RBC # BLD AUTO: 5 MIL/UL (ref 4–5.2)
SODIUM SERPL-SCNC: 145 MMOL/L (ref 136–145)
WBC NRBC COR # BLD AUTO: 13.9 K/UL (ref 4.3–11)

## 2020-08-16 RX ADMIN — SODIUM CHLORIDE SCH MLS/HR: 9 INJECTION, SOLUTION INTRAVENOUS at 14:28

## 2020-08-16 RX ADMIN — OLOPATADINE HYDROCHLORIDE SCH ML: 1 SOLUTION/ DROPS OPHTHALMIC at 17:45

## 2020-08-16 RX ADMIN — Medication SCH TAB: at 08:07

## 2020-08-16 RX ADMIN — ENOXAPARIN SODIUM SCH MG: 30 INJECTION SUBCUTANEOUS at 20:12

## 2020-08-16 RX ADMIN — ASPIRIN 81 MG SCH MG: 81 TABLET ORAL at 08:05

## 2020-08-16 RX ADMIN — ESCITALOPRAM OXALATE SCH MG: 10 TABLET, FILM COATED ORAL at 08:06

## 2020-08-16 RX ADMIN — Medication SCH EACH: at 12:25

## 2020-08-16 RX ADMIN — MIRTAZAPINE SCH MG: 15 TABLET, FILM COATED ORAL at 21:01

## 2020-08-16 RX ADMIN — QUETIAPINE SCH MG: 25 TABLET, FILM COATED ORAL at 08:07

## 2020-08-16 RX ADMIN — PIPERACILLIN SODIUM AND TAZOBACTAM SODIUM SCH MLS/HR: .375; 3 INJECTION, POWDER, LYOPHILIZED, FOR SOLUTION INTRAVENOUS at 03:46

## 2020-08-16 RX ADMIN — ATORVASTATIN CALCIUM SCH MG: 40 TABLET, FILM COATED ORAL at 21:01

## 2020-08-16 RX ADMIN — Medication SCH EACH: at 21:33

## 2020-08-16 RX ADMIN — DEXTROSE MONOHYDRATE PRN MLS/HR: 50 INJECTION, SOLUTION INTRAVENOUS at 20:09

## 2020-08-16 RX ADMIN — Medication SCH EACH: at 09:33

## 2020-08-16 RX ADMIN — PANTOPRAZOLE SODIUM SCH MG: 40 TABLET, DELAYED RELEASE ORAL at 20:10

## 2020-08-16 RX ADMIN — Medication SCH MG: at 17:00

## 2020-08-16 RX ADMIN — POTASSIUM CHLORIDE SCH MLS/HR: 200 INJECTION, SOLUTION INTRAVENOUS at 11:47

## 2020-08-16 RX ADMIN — Medication SCH MG: at 08:06

## 2020-08-16 RX ADMIN — INSULIN HUMAN PRN UNIT: 100 INJECTION, SOLUTION PARENTERAL at 12:26

## 2020-08-16 RX ADMIN — LEVOTHYROXINE SODIUM SCH MCG: 75 TABLET ORAL at 07:30

## 2020-08-16 RX ADMIN — PIPERACILLIN SODIUM AND TAZOBACTAM SODIUM SCH MLS/HR: .375; 3 INJECTION, POWDER, LYOPHILIZED, FOR SOLUTION INTRAVENOUS at 11:47

## 2020-08-16 RX ADMIN — DEXAMETHASONE SODIUM PHOSPHATE SCH MG: 10 INJECTION INTRAMUSCULAR; INTRAVENOUS at 09:33

## 2020-08-16 RX ADMIN — POTASSIUM CHLORIDE SCH MLS/HR: 200 INJECTION, SOLUTION INTRAVENOUS at 13:47

## 2020-08-16 RX ADMIN — PIPERACILLIN SODIUM AND TAZOBACTAM SODIUM SCH MLS/HR: .375; 3 INJECTION, POWDER, LYOPHILIZED, FOR SOLUTION INTRAVENOUS at 20:10

## 2020-08-16 RX ADMIN — Medication SCH EACH: at 18:25

## 2020-08-16 RX ADMIN — CLOPIDOGREL BISULFATE SCH MG: 75 TABLET, FILM COATED ORAL at 08:06

## 2020-08-16 RX ADMIN — QUETIAPINE SCH MG: 25 TABLET, FILM COATED ORAL at 17:00

## 2020-08-16 RX ADMIN — OLOPATADINE HYDROCHLORIDE SCH ML: 1 SOLUTION/ DROPS OPHTHALMIC at 09:33

## 2020-08-16 RX ADMIN — PANTOPRAZOLE SODIUM SCH MG: 40 TABLET, DELAYED RELEASE ORAL at 08:07

## 2020-08-16 NOTE — NUR
TELE RN NOTES



RT AT BEDSIDE FOR DEEP SUCTION; SPO2 IN 85- 89%; CHARGE NURSE AWARE; WILL CONT TO MONITOR

## 2020-08-16 NOTE — NUR
tele rn note 



informed on call MD alisa matthews that patient has depakote 250mg PO but patient is NPO 
and failed swallow eval and has not received medication since 8/11/20. and if he would like 
to convert medication to iv. no seizures noted. MD telephone ordered ok to hold medication 
d/t patient needed the medication for psych and not seizures. order read back, noted and 
carried out.

## 2020-08-16 NOTE — NUR
tele rn note - accucheck 



blood sugar reads 284. patient is npo and on d5w @50ml/hr. no inulin given. will continue to 
monitor.

## 2020-08-16 NOTE — NUR
TELE RN CLOSING NOTES 



PATIENT RESTING IN BED COMFORTABLY, CONFUSED; PATIENT ON 15LPM VIA NON-REBREATHER MASK; 
TOLERATING WELL, CURRENTLY SATTING @ 90%; TELE MONITOR ATTACHED, READS NORMAL SINUS RHYTHM 
80S BPM; CHUCHO MIDLINE #18 INTACT AND PATENT, INFUSING D5W @ 100ML/HR; TOLERATING IVF WELL; L 
WRIST #24, INTACT AND PATENT; PATIENT HAS L WRIST RESTRAINT; NO EVIDENCE OF SKIN BREAKDOWN 
NOTED THROUGHOUT SHIFT; NPO STATUS MAINTAINED; DOAN IN PLACE, WITH YELLOW OUTPUT OF 500CC 
THROUGHOUT SHIFT; ALL NEEDS RENDERED; ISOLATION PRECAUTIONS MAINTAINED; SAFETY PRECAUTIONS 
IMPLEMENTED; WILL ENDORSE USMAN TO ONCOMING SHIFT

## 2020-08-16 NOTE — NUR
TELE RN NOTES



HIGHEST SPO2 READING THROUGHOUT SHIFT, 92%; PATIENT ON 15 LPM VIA NON-REBREATHER; PATIENT 
ABLE TO TAKE OFF MASK BY MOVING HER MOUTH; CHARGE NURSE IS AWARE; RT NG SUCTIONS PATIENT 
PRN; WILL INFORM DAY SHIFT; WILL CONT TO MONITOR

## 2020-08-16 NOTE — NUR
TELE RN NOTE 



VITALS SIGNS SHOWS SLIGHTLY HYPOTENSION 90/48, HR 95. CHARGE NURSE AWARE. WILL CONTINUE TO 
MONITOR.

## 2020-08-16 NOTE — NUR
Received patient on non rebreathing mask 15 L, highest saturation 90%. Left arm soft 
restrain for safety , normal circulation. Patient is NPO ; failed swallowing evaluation. 
Will monitor closely

## 2020-08-16 NOTE — NUR
Patient remains on non rebreather 15L, saturating 92% , non-verbal , responsive to touch and 
pain. All needs attended. Potassium replaced as ordered. Patient is NPO and D5 running as 
ordered. Urin output 450 ml. Safety precautions in place. Will endorse to next shift for USMAN

## 2020-08-16 NOTE — NUR
tele rn opening note 



received patient in bed. nonverbal. on oxygen 15l/min via nonrebreather o2 sat 
91%.respirations are currently even and unlabored. no s/s pain at this time. external tele 
monitor reads sinus rhyth hr 96. in no apparent distress at this time. iv access in CHUCHO 
midline running D5W@50ml/hr and Left hand #24 patent and saline locked. patent has an order 
for a left soft wrist restraint but she is currently released from it d/t patient is 
sleeping and not currently trying rosalie remove lines or oxygen mask. bed is low and locked, 
hob elevated in semi fowlers,on specialty matress, side rials up x3 call light within 
reach,.will continue to monitor.

## 2020-08-17 VITALS — SYSTOLIC BLOOD PRESSURE: 107 MMHG | DIASTOLIC BLOOD PRESSURE: 68 MMHG

## 2020-08-17 VITALS — DIASTOLIC BLOOD PRESSURE: 66 MMHG | SYSTOLIC BLOOD PRESSURE: 102 MMHG

## 2020-08-17 VITALS — SYSTOLIC BLOOD PRESSURE: 113 MMHG | DIASTOLIC BLOOD PRESSURE: 70 MMHG

## 2020-08-17 VITALS — DIASTOLIC BLOOD PRESSURE: 71 MMHG | SYSTOLIC BLOOD PRESSURE: 116 MMHG

## 2020-08-17 VITALS — SYSTOLIC BLOOD PRESSURE: 112 MMHG | DIASTOLIC BLOOD PRESSURE: 63 MMHG

## 2020-08-17 VITALS — SYSTOLIC BLOOD PRESSURE: 100 MMHG | DIASTOLIC BLOOD PRESSURE: 65 MMHG

## 2020-08-17 LAB
ALBUMIN SERPL BCP-MCNC: 1.6 G/DL (ref 3.4–5)
ALP SERPL-CCNC: 124 U/L (ref 46–116)
ALT SERPL W P-5'-P-CCNC: 13 U/L (ref 12–78)
AST SERPL W P-5'-P-CCNC: 22 U/L (ref 15–37)
BASOPHILS # BLD AUTO: 0 /CMM (ref 0–0.2)
BASOPHILS NFR BLD AUTO: 0.2 % (ref 0–2)
BILIRUB SERPL-MCNC: 0.5 MG/DL (ref 0.2–1)
BUN SERPL-MCNC: 54 MG/DL (ref 7–18)
CALCIUM SERPL-MCNC: 10 MG/DL (ref 8.5–10.1)
CHLORIDE SERPL-SCNC: 111 MMOL/L (ref 98–107)
CO2 SERPL-SCNC: 24 MMOL/L (ref 21–32)
CREAT SERPL-MCNC: 1.2 MG/DL (ref 0.6–1.3)
EOSINOPHIL NFR BLD AUTO: 0 % (ref 0–6)
GLUCOSE SERPL-MCNC: 311 MG/DL (ref 74–106)
HCT VFR BLD AUTO: 39 % (ref 33–45)
HGB BLD-MCNC: 12 G/DL (ref 11.5–14.8)
LYMPHOCYTES NFR BLD AUTO: 0.6 /CMM (ref 0.8–4.8)
LYMPHOCYTES NFR BLD AUTO: 4.8 % (ref 20–44)
MAGNESIUM SERPL-MCNC: 2 MG/DL (ref 1.8–2.4)
MCHC RBC AUTO-ENTMCNC: 31 G/DL (ref 31–36)
MCV RBC AUTO: 81 FL (ref 82–100)
MONOCYTES NFR BLD AUTO: 0.6 /CMM (ref 0.1–1.3)
MONOCYTES NFR BLD AUTO: 5.2 % (ref 2–12)
NEUTROPHILS # BLD AUTO: 10.9 /CMM (ref 1.8–8.9)
NEUTROPHILS NFR BLD AUTO: 89.8 % (ref 43–81)
PHOSPHATE SERPL-MCNC: 3.7 MG/DL (ref 2.5–4.9)
PLATELET # BLD AUTO: 346 /CMM (ref 150–450)
POTASSIUM SERPL-SCNC: 4 MMOL/L (ref 3.5–5.1)
PROT SERPL-MCNC: 6.2 G/DL (ref 6.4–8.2)
RBC # BLD AUTO: 4.83 MIL/UL (ref 4–5.2)
SODIUM SERPL-SCNC: 145 MMOL/L (ref 136–145)
WBC NRBC COR # BLD AUTO: 12.1 K/UL (ref 4.3–11)

## 2020-08-17 RX ADMIN — OLOPATADINE HYDROCHLORIDE SCH ML: 1 SOLUTION/ DROPS OPHTHALMIC at 08:21

## 2020-08-17 RX ADMIN — Medication SCH EACH: at 07:56

## 2020-08-17 RX ADMIN — CLOPIDOGREL BISULFATE SCH MG: 75 TABLET, FILM COATED ORAL at 08:12

## 2020-08-17 RX ADMIN — Medication SCH MG: at 16:15

## 2020-08-17 RX ADMIN — PIPERACILLIN SODIUM AND TAZOBACTAM SODIUM SCH MLS/HR: .375; 3 INJECTION, POWDER, LYOPHILIZED, FOR SOLUTION INTRAVENOUS at 03:17

## 2020-08-17 RX ADMIN — Medication SCH MG: at 08:11

## 2020-08-17 RX ADMIN — QUETIAPINE SCH MG: 25 TABLET, FILM COATED ORAL at 08:12

## 2020-08-17 RX ADMIN — DEXAMETHASONE SODIUM PHOSPHATE SCH MG: 10 INJECTION INTRAMUSCULAR; INTRAVENOUS at 08:20

## 2020-08-17 RX ADMIN — PIPERACILLIN SODIUM AND TAZOBACTAM SODIUM SCH MLS/HR: .375; 3 INJECTION, POWDER, LYOPHILIZED, FOR SOLUTION INTRAVENOUS at 20:35

## 2020-08-17 RX ADMIN — ASPIRIN 81 MG SCH MG: 81 TABLET ORAL at 08:11

## 2020-08-17 RX ADMIN — SODIUM CHLORIDE SCH MLS/HR: 9 INJECTION, SOLUTION INTRAVENOUS at 13:57

## 2020-08-17 RX ADMIN — ESCITALOPRAM OXALATE SCH MG: 10 TABLET, FILM COATED ORAL at 08:11

## 2020-08-17 RX ADMIN — Medication SCH EACH: at 12:23

## 2020-08-17 RX ADMIN — Medication SCH EACH: at 18:01

## 2020-08-17 RX ADMIN — ENOXAPARIN SODIUM SCH MG: 30 INJECTION SUBCUTANEOUS at 21:43

## 2020-08-17 RX ADMIN — PIPERACILLIN SODIUM AND TAZOBACTAM SODIUM SCH MLS/HR: .375; 3 INJECTION, POWDER, LYOPHILIZED, FOR SOLUTION INTRAVENOUS at 12:09

## 2020-08-17 RX ADMIN — ATORVASTATIN CALCIUM SCH MG: 40 TABLET, FILM COATED ORAL at 21:43

## 2020-08-17 RX ADMIN — OLOPATADINE HYDROCHLORIDE SCH ML: 1 SOLUTION/ DROPS OPHTHALMIC at 16:22

## 2020-08-17 RX ADMIN — MIRTAZAPINE SCH MG: 15 TABLET, FILM COATED ORAL at 22:00

## 2020-08-17 RX ADMIN — PANTOPRAZOLE SODIUM SCH MG: 40 TABLET, DELAYED RELEASE ORAL at 21:43

## 2020-08-17 RX ADMIN — Medication SCH EACH: at 22:18

## 2020-08-17 RX ADMIN — PANTOPRAZOLE SODIUM SCH MG: 40 TABLET, DELAYED RELEASE ORAL at 08:12

## 2020-08-17 RX ADMIN — HUMAN INSULIN PRN UNIT: 100 INJECTION, SOLUTION SUBCUTANEOUS at 22:31

## 2020-08-17 RX ADMIN — Medication SCH TAB: at 08:12

## 2020-08-17 RX ADMIN — QUETIAPINE SCH MG: 25 TABLET, FILM COATED ORAL at 16:15

## 2020-08-17 RX ADMIN — LEVOTHYROXINE SODIUM SCH MCG: 75 TABLET ORAL at 07:30

## 2020-08-17 NOTE — NUR
RN OPENING NOTE



RECEIVED PT IN THE BED AWAKE . PT  IS NON-VERBAL, AND MAKES UNCLEAR SOUNDS. PT IS ON HIGH 
FLOW , AND NON REBREATHER SATING 92%.PT ON TELE MONITOR SHOWING SR  IN 70S. PT HAS A DOAN, 
DRAINING YELLOW URINE.  PT HAS L SOFT WRIST RESTRAINT. PT HAS CHUCHO MIDLINE, D5W RUNNING AT 50 
ML/H AND L HAND 22G S/L. SAFETY MEASURES SAFETY MEASURES IN PLACE  BED AT LOWEST POSITION, 
LOCKED, SIDE RAILS UPX2, CALL LIGHT WITHIN REACH. WILL CONTINUE TO MONITOR .

## 2020-08-17 NOTE — NUR
RT NOTE



PT PLACED ON HIGH FLOW NASAL CANNULA PER MD ORDER. SETTING AS FOLLOW 60 L 90%. PT ALSO HAS A 
NON REBREATHER PLACED PER MD ORDER. HFNC PLUGGED IN TO RED OUTLET. AMBU BAG AT BED SIDE. NO 
DISTRESS NOTED AT MOMENT. MICHAEL MATTSON NOTIFIED.

-------------------------------------------------------------------------------

Addendum: 08/17/20 at 1517 by RICARDO AHUJA RT

-------------------------------------------------------------------------------

Amended: Links added.

## 2020-08-17 NOTE — NUR
RN CLOSING NOTE: PATIENT REMAINS IN ROOM. CONTINUE MONITORING PATIENT. CONTINUE TO MONITOR 
RESP STATUS. SAFETY MEASURES IMPLEMENTED, BED IN LOWEST POSITION, LOCKED, SIDE RAILS UP, 
CALL LIGHT WITHIN REACH. ENDORSE TO ONCOMING SHIFT RN FOR CONTINUITY OF CARE.

## 2020-08-17 NOTE — NUR
RN OPENING NOTE: RECEIVED PATIENT IN BED THIS MORNING. PATIENT IS NON-VERBAL, OPENS EYES TO 
VOICE, MAKES INAUDIBLE SOUNDS. PATIENT IS SR IN THE 80S ON TELE MONITOR. PATIENT HAS A 
DOAN, DRAINING. WOUND CARE PER ORDERS. L SOFT WRIST RESTRAINT. CHUCHO MIDLINE, #24 L WRIST, 
C/D/I, FLUSHES WELL, NO SIGNS OF COMPLICATIONS NOTED. SAFETY MEASURES IMPLEMENTED, BED IN 
LOWEST POSITION, LOCKED, SIDE RAILS UP, CALL LIGHT WITHIN REACH. WILL CONTINUE TO MONITOR 
PATIENT FOR CHANGES.

## 2020-08-17 NOTE — NUR
tele rn closing note 



patient in bed. nonverbal, moaning, mumbling. remains on oxygen 15l/min via nonrebreather o2 
sat 86-90% no s/s pain noted. external tele monitor reads sinus rhyth hr 90s. no distress 
noted. iv access maintained in CHUCHO midline running D5W@50ml/hr and Left hand #24 remains 
patent and saline locked. patient continues to be released from left wrist soft restraint. 
bed remains low and locked, hob elevated in semi fowlers,on specialty matress, side rials up 
x3 call light within reach. will endorse to next shift.

## 2020-08-18 VITALS — DIASTOLIC BLOOD PRESSURE: 48 MMHG | SYSTOLIC BLOOD PRESSURE: 93 MMHG

## 2020-08-18 VITALS — SYSTOLIC BLOOD PRESSURE: 117 MMHG | DIASTOLIC BLOOD PRESSURE: 56 MMHG

## 2020-08-18 VITALS — DIASTOLIC BLOOD PRESSURE: 63 MMHG | SYSTOLIC BLOOD PRESSURE: 119 MMHG

## 2020-08-18 VITALS — SYSTOLIC BLOOD PRESSURE: 119 MMHG | DIASTOLIC BLOOD PRESSURE: 63 MMHG

## 2020-08-18 VITALS — SYSTOLIC BLOOD PRESSURE: 108 MMHG | DIASTOLIC BLOOD PRESSURE: 50 MMHG

## 2020-08-18 VITALS — SYSTOLIC BLOOD PRESSURE: 98 MMHG | DIASTOLIC BLOOD PRESSURE: 48 MMHG

## 2020-08-18 LAB
ALBUMIN SERPL BCP-MCNC: 1.5 G/DL (ref 3.4–5)
ALP SERPL-CCNC: 95 U/L (ref 46–116)
ALT SERPL W P-5'-P-CCNC: 9 U/L (ref 12–78)
AST SERPL W P-5'-P-CCNC: 28 U/L (ref 15–37)
BASOPHILS # BLD AUTO: 0 /CMM (ref 0–0.2)
BASOPHILS NFR BLD AUTO: 0.1 % (ref 0–2)
BILIRUB SERPL-MCNC: 0.5 MG/DL (ref 0.2–1)
BUN SERPL-MCNC: 62 MG/DL (ref 7–18)
CALCIUM SERPL-MCNC: 9.7 MG/DL (ref 8.5–10.1)
CHLORIDE SERPL-SCNC: 109 MMOL/L (ref 98–107)
CO2 SERPL-SCNC: 18 MMOL/L (ref 21–32)
CREAT SERPL-MCNC: 1.3 MG/DL (ref 0.6–1.3)
EOSINOPHIL NFR BLD AUTO: 0.1 % (ref 0–6)
GLUCOSE SERPL-MCNC: 412 MG/DL (ref 74–106)
HCT VFR BLD AUTO: 38 % (ref 33–45)
HGB BLD-MCNC: 11.7 G/DL (ref 11.5–14.8)
LYMPHOCYTES NFR BLD AUTO: 0.5 /CMM (ref 0.8–4.8)
LYMPHOCYTES NFR BLD AUTO: 4.4 % (ref 20–44)
MAGNESIUM SERPL-MCNC: 2.1 MG/DL (ref 1.8–2.4)
MCHC RBC AUTO-ENTMCNC: 31 G/DL (ref 31–36)
MCV RBC AUTO: 81 FL (ref 82–100)
MONOCYTES NFR BLD AUTO: 0.5 /CMM (ref 0.1–1.3)
MONOCYTES NFR BLD AUTO: 4.9 % (ref 2–12)
NEUTROPHILS # BLD AUTO: 9.9 /CMM (ref 1.8–8.9)
NEUTROPHILS NFR BLD AUTO: 90.5 % (ref 43–81)
PHOSPHATE SERPL-MCNC: 3 MG/DL (ref 2.5–4.9)
PLATELET # BLD AUTO: 319 /CMM (ref 150–450)
POTASSIUM SERPL-SCNC: 3.8 MMOL/L (ref 3.5–5.1)
PROT SERPL-MCNC: 6 G/DL (ref 6.4–8.2)
RBC # BLD AUTO: 4.68 MIL/UL (ref 4–5.2)
SODIUM SERPL-SCNC: 141 MMOL/L (ref 136–145)
WBC NRBC COR # BLD AUTO: 10.9 K/UL (ref 4.3–11)

## 2020-08-18 RX ADMIN — PIPERACILLIN SODIUM AND TAZOBACTAM SODIUM SCH MLS/HR: .375; 3 INJECTION, POWDER, LYOPHILIZED, FOR SOLUTION INTRAVENOUS at 04:06

## 2020-08-18 RX ADMIN — Medication SCH EACH: at 18:18

## 2020-08-18 RX ADMIN — Medication SCH TAB: at 09:00

## 2020-08-18 RX ADMIN — INSULIN HUMAN PRN UNIT: 100 INJECTION, SOLUTION PARENTERAL at 12:10

## 2020-08-18 RX ADMIN — INSULIN HUMAN PRN UNIT: 100 INJECTION, SOLUTION PARENTERAL at 09:00

## 2020-08-18 RX ADMIN — Medication SCH MG: at 17:00

## 2020-08-18 RX ADMIN — Medication SCH EACH: at 12:11

## 2020-08-18 RX ADMIN — DEXTROSE MONOHYDRATE PRN MLS/HR: 50 INJECTION, SOLUTION INTRAVENOUS at 04:04

## 2020-08-18 RX ADMIN — HUMAN INSULIN PRN UNIT: 100 INJECTION, SOLUTION SUBCUTANEOUS at 17:57

## 2020-08-18 RX ADMIN — QUETIAPINE SCH MG: 25 TABLET, FILM COATED ORAL at 17:00

## 2020-08-18 RX ADMIN — Medication SCH EACH: at 22:09

## 2020-08-18 RX ADMIN — LEVOTHYROXINE SODIUM SCH MCG: 75 TABLET ORAL at 07:30

## 2020-08-18 RX ADMIN — DEXAMETHASONE SODIUM PHOSPHATE SCH MG: 10 INJECTION INTRAMUSCULAR; INTRAVENOUS at 10:00

## 2020-08-18 RX ADMIN — OLOPATADINE HYDROCHLORIDE SCH ML: 1 SOLUTION/ DROPS OPHTHALMIC at 17:00

## 2020-08-18 RX ADMIN — PIPERACILLIN SODIUM AND TAZOBACTAM SODIUM SCH MLS/HR: .375; 3 INJECTION, POWDER, LYOPHILIZED, FOR SOLUTION INTRAVENOUS at 12:17

## 2020-08-18 RX ADMIN — HUMAN INSULIN PRN UNIT: 100 INJECTION, SOLUTION SUBCUTANEOUS at 22:08

## 2020-08-18 RX ADMIN — Medication SCH EACH: at 10:37

## 2020-08-18 RX ADMIN — ASPIRIN 81 MG SCH MG: 81 TABLET ORAL at 09:00

## 2020-08-18 RX ADMIN — HUMAN INSULIN PRN UNIT: 100 INJECTION, SOLUTION SUBCUTANEOUS at 09:40

## 2020-08-18 RX ADMIN — QUETIAPINE SCH MG: 25 TABLET, FILM COATED ORAL at 09:00

## 2020-08-18 RX ADMIN — CLOPIDOGREL BISULFATE SCH MG: 75 TABLET, FILM COATED ORAL at 09:00

## 2020-08-18 RX ADMIN — OLOPATADINE HYDROCHLORIDE SCH ML: 1 SOLUTION/ DROPS OPHTHALMIC at 09:00

## 2020-08-18 RX ADMIN — ESCITALOPRAM OXALATE SCH MG: 10 TABLET, FILM COATED ORAL at 09:00

## 2020-08-18 RX ADMIN — MIRTAZAPINE SCH MG: 15 TABLET, FILM COATED ORAL at 22:00

## 2020-08-18 RX ADMIN — ENOXAPARIN SODIUM SCH MG: 30 INJECTION SUBCUTANEOUS at 20:31

## 2020-08-18 RX ADMIN — PANTOPRAZOLE SODIUM SCH MG: 40 TABLET, DELAYED RELEASE ORAL at 20:39

## 2020-08-18 RX ADMIN — Medication SCH MG: at 09:00

## 2020-08-18 RX ADMIN — PIPERACILLIN SODIUM AND TAZOBACTAM SODIUM SCH MLS/HR: .375; 3 INJECTION, POWDER, LYOPHILIZED, FOR SOLUTION INTRAVENOUS at 20:27

## 2020-08-18 NOTE — NUR
discussed with nursing sup gideon  family cannot visit in the unit r/t covid can only do face 
time,md and primary rn made aware.will schedule face time with family.

## 2020-08-18 NOTE — NUR
RN OPENING NOTES 



PATIENT RECEIVED RESTING IN BED A/O X O, NONVERBAL. ON HIGH FLOW OXYGEN AND NON REBREATHER @ 
15L. LABORED BREATHING NOTED. NO FACIAL GRIMACING NOTED,. DOAN CATH NOTED AND IN PLACE 
DRAINING YELLOW URINE. CHUCHO MIDLINE RUNNING D5W @ 50 ML/HR. PATIENT IN HIGH FOWLERS POSITION, 
SAFETY PRECAUTIONS IN PLACE WITH BED IN LOWEST POSITION, CALL LIGHT WITHIN REACH, BREAKS ON, 
SIDE RAILS UP. WILL CONTINUE TO MONITOR THROUGHOUT THE NIGHT.

## 2020-08-18 NOTE — NUR
RN OPENING NOTE:



Received patient in bed. Awake and non verbal, moans and makes noises. Isolation precaution 
in place for COVID-19. On cont. 02 via High flow oxygen and Non rebreather mask @ 15lpm with 
saturation noted at 82%. Patient with labored breathing noted. DNR/DNI status noted with 
notes from previous shifts indicating that family strongly refused intubation. Tele monitor 
showing sinus rhythm in the 90s. Mcclani catheter in place and draining yellow urine. Iv site 
clean, dry, patent and intact. Patient on NPO with D5W @ 50mls/hr being tolerated well. No 
pain noted on patient. Call light in reach. Bed locked, low and at semi-nixon's position. 
Side rails up x3. Safety ensured and observed. Will continue to monitor.

## 2020-08-18 NOTE — NUR
RN CLOSING NOTE:



Patient remains in bed, still awake and non verbal, moans and makes noises. Isolation 
precaution in place for COVID-19. On cont. 02 via High flow oxygen and Non rebreather mask @ 
15lpm with saturation noted at 85%. Labored breathing still noted. DNR/DNI status. Tele 
monitor showing sinus rhythm in the 80s. Mcclain catheter in place and draining yellow urine. 
Iv site clean, dry, patent and intact. Patient on NPO with D5W @ 50mls/hr being tolerated 
well. Patient seen by Alex Vasquez DNP on shift. No pain noted on patient. Call light in 
reach. Bed locked, low and at semi-nixon's position. Side rails up x3. Safety ensured and 
observed. Due medications given. Treatment given as ordered. Endorsed to oncoming shift for 
USMAN.

## 2020-08-18 NOTE — NUR
RN CLOSING NOTE



PT IS AWAKE. PT IS ON 60 L HIGH FLOW AND 15 L VIA NC SATING 80%. WILL ENDORSE TO INCOMING 
SHIFT FOR USMAN.

## 2020-08-19 VITALS — SYSTOLIC BLOOD PRESSURE: 111 MMHG | DIASTOLIC BLOOD PRESSURE: 47 MMHG

## 2020-08-19 VITALS — SYSTOLIC BLOOD PRESSURE: 103 MMHG | DIASTOLIC BLOOD PRESSURE: 45 MMHG

## 2020-08-19 VITALS — DIASTOLIC BLOOD PRESSURE: 49 MMHG | SYSTOLIC BLOOD PRESSURE: 107 MMHG

## 2020-08-19 VITALS — DIASTOLIC BLOOD PRESSURE: 58 MMHG | SYSTOLIC BLOOD PRESSURE: 100 MMHG

## 2020-08-19 LAB
ALBUMIN SERPL BCP-MCNC: 1.5 G/DL (ref 3.4–5)
ALP SERPL-CCNC: 99 U/L (ref 46–116)
ALT SERPL W P-5'-P-CCNC: 9 U/L (ref 12–78)
AST SERPL W P-5'-P-CCNC: 18 U/L (ref 15–37)
BASOPHILS # BLD AUTO: 0 /CMM (ref 0–0.2)
BASOPHILS NFR BLD AUTO: 0.2 % (ref 0–2)
BILIRUB SERPL-MCNC: 0.4 MG/DL (ref 0.2–1)
BUN SERPL-MCNC: 66 MG/DL (ref 7–18)
CALCIUM SERPL-MCNC: 10.2 MG/DL (ref 8.5–10.1)
CHLORIDE SERPL-SCNC: 110 MMOL/L (ref 98–107)
CO2 SERPL-SCNC: 25 MMOL/L (ref 21–32)
CREAT SERPL-MCNC: 1.5 MG/DL (ref 0.6–1.3)
EOSINOPHIL NFR BLD AUTO: 0 % (ref 0–6)
GLUCOSE SERPL-MCNC: 214 MG/DL (ref 74–106)
HCT VFR BLD AUTO: 42 % (ref 33–45)
HGB BLD-MCNC: 12.8 G/DL (ref 11.5–14.8)
LYMPHOCYTES NFR BLD AUTO: 0.9 /CMM (ref 0.8–4.8)
LYMPHOCYTES NFR BLD AUTO: 6.7 % (ref 20–44)
MAGNESIUM SERPL-MCNC: 2 MG/DL (ref 1.8–2.4)
MCHC RBC AUTO-ENTMCNC: 31 G/DL (ref 31–36)
MCV RBC AUTO: 81 FL (ref 82–100)
MONOCYTES NFR BLD AUTO: 0.5 /CMM (ref 0.1–1.3)
MONOCYTES NFR BLD AUTO: 3.4 % (ref 2–12)
NEUTROPHILS # BLD AUTO: 12.6 /CMM (ref 1.8–8.9)
NEUTROPHILS NFR BLD AUTO: 89.7 % (ref 43–81)
PHOSPHATE SERPL-MCNC: 3.2 MG/DL (ref 2.5–4.9)
PLATELET # BLD AUTO: 308 /CMM (ref 150–450)
POTASSIUM SERPL-SCNC: 3.7 MMOL/L (ref 3.5–5.1)
PROT SERPL-MCNC: 6.3 G/DL (ref 6.4–8.2)
RBC # BLD AUTO: 5.16 MIL/UL (ref 4–5.2)
SODIUM SERPL-SCNC: 145 MMOL/L (ref 136–145)
WBC NRBC COR # BLD AUTO: 14.1 K/UL (ref 4.3–11)

## 2020-08-19 RX ADMIN — Medication SCH EACH: at 12:23

## 2020-08-19 RX ADMIN — CLOPIDOGREL BISULFATE SCH MG: 75 TABLET, FILM COATED ORAL at 09:00

## 2020-08-19 RX ADMIN — PIPERACILLIN SODIUM AND TAZOBACTAM SODIUM SCH MLS/HR: .375; 3 INJECTION, POWDER, LYOPHILIZED, FOR SOLUTION INTRAVENOUS at 12:05

## 2020-08-19 RX ADMIN — Medication SCH EACH: at 07:59

## 2020-08-19 RX ADMIN — ESCITALOPRAM OXALATE SCH MG: 10 TABLET, FILM COATED ORAL at 09:00

## 2020-08-19 RX ADMIN — PIPERACILLIN SODIUM AND TAZOBACTAM SODIUM SCH MLS/HR: .375; 3 INJECTION, POWDER, LYOPHILIZED, FOR SOLUTION INTRAVENOUS at 04:05

## 2020-08-19 RX ADMIN — HUMAN INSULIN PRN UNIT: 100 INJECTION, SOLUTION SUBCUTANEOUS at 08:03

## 2020-08-19 RX ADMIN — QUETIAPINE SCH MG: 25 TABLET, FILM COATED ORAL at 09:00

## 2020-08-19 RX ADMIN — LEVOTHYROXINE SODIUM SCH MCG: 75 TABLET ORAL at 07:30

## 2020-08-19 RX ADMIN — HUMAN INSULIN PRN UNIT: 100 INJECTION, SOLUTION SUBCUTANEOUS at 12:31

## 2020-08-19 RX ADMIN — Medication SCH TAB: at 09:00

## 2020-08-19 RX ADMIN — Medication SCH MG: at 09:00

## 2020-08-19 RX ADMIN — Medication PRN MG: at 08:41

## 2020-08-19 RX ADMIN — ASPIRIN 81 MG SCH MG: 81 TABLET ORAL at 09:00

## 2020-08-19 RX ADMIN — OLOPATADINE HYDROCHLORIDE SCH DROP: 1 SOLUTION/ DROPS OPHTHALMIC at 09:45

## 2020-08-19 RX ADMIN — PANTOPRAZOLE SODIUM SCH MG: 40 TABLET, DELAYED RELEASE ORAL at 09:00

## 2020-08-19 RX ADMIN — DEXAMETHASONE SODIUM PHOSPHATE SCH MG: 10 INJECTION INTRAMUSCULAR; INTRAVENOUS at 09:48

## 2020-08-19 NOTE — NUR
patient stop breathing,no vital signs,pronounced ,dr. lloyd flores notified 
,daughter anna notified ,no mortuary yet instructed and gave nursing supervisor phone 
number when ready to  body in the morgue.nurisking sup aware,released by [facility] case 
number -[id number]/O BRANDAN.

## 2020-08-19 NOTE — NUR
addendum patient niece outside pt. room,on occular visit observing strict isolation 
precaution with PPE.FACE TIMING PATIENT WITH HELP OF PRIMARY NURSE.

## 2020-08-19 NOTE — NUR
RN TELE/ALEX OPENING NOTE

RECIEVED PT IN BED. PT IS DNR/DNI. PT IS ON HIGH FLOW AND NON REBREATHER MASK AT 15L 
SATURATING AT 52% AT THIS TIME. NO ACUTE DISTRESS NOTED. HOB ELEVATED AS TOLERATED. NPO 
OBSERVED. PT HAS A RIGHT UPPER MIDLINE INTACT, PATENT AND FLUSHED WELL. FREQUENT MONITORING 
PROVIDED TO ENSURE PT SAFETY. PT IS DRY CLEAN AND COMFORTABLE. CALL LIGHT WITHIN REACH AND 
FUNCTIONING. BED LOCKED AND IN LOWEST POSITION. WILL CONTINUE TO MONITOR AND ASSESS PT.

## 2020-08-19 NOTE — NUR
RN CLOSING NOTES 



PATIENT RESTING IN BED A/O X 0, NONVERBAL. ON HIGH FLOW OXYGEN AND NON REBREATHER @ 15L. 
LABORED BREATHING NOTED O2 SAT 71%. DNR / DNI. NO FACIAL GRIMACING NOTED,. DOAN CATH NOTED 
AND IN PLACE DRAINING YELLOW URINE. CHUCHO MIDLINE RUNNING D5W @ 50 ML/HR. PATIENT IN HIGH 
FOWLERS POSITION, SAFETY PRECAUTIONS IN PLACE WITH BED IN LOWEST POSITION, CALL LIGHT WITHIN 
REACH, BREAKS ON, SIDE RAILS UP. ALL NEEDS ATTENDED TO. PATIENT KEPT CLEAN AND DRY. WILL 
ENDORSE TO ONCOMING SHIFT ABOUT USMAN.

## 2023-06-27 NOTE — NUR
Chart reviewed, agree with LPN plan.  Pt has been advised on ETOH + warfarin interaction and risks several times.       PER JUNITO SINCLAIR NP, PATIENT TO CONTINUE DOSE OF VANCO FOR HX OF CDIFF, DESPITE NEGATIVE 
RESULTS THIS ADMISSION.